# Patient Record
Sex: FEMALE | Race: WHITE | Employment: FULL TIME | URBAN - METROPOLITAN AREA
[De-identification: names, ages, dates, MRNs, and addresses within clinical notes are randomized per-mention and may not be internally consistent; named-entity substitution may affect disease eponyms.]

---

## 2018-02-27 PROCEDURE — 86901 BLOOD TYPING SEROLOGIC RH(D): CPT | Performed by: OBSTETRICS & GYNECOLOGY

## 2018-02-27 PROCEDURE — 86762 RUBELLA ANTIBODY: CPT | Performed by: OBSTETRICS & GYNECOLOGY

## 2018-02-27 PROCEDURE — 87389 HIV-1 AG W/HIV-1&-2 AB AG IA: CPT | Performed by: OBSTETRICS & GYNECOLOGY

## 2018-02-27 PROCEDURE — 86780 TREPONEMA PALLIDUM: CPT | Performed by: OBSTETRICS & GYNECOLOGY

## 2018-02-27 PROCEDURE — 86900 BLOOD TYPING SEROLOGIC ABO: CPT | Performed by: OBSTETRICS & GYNECOLOGY

## 2018-02-27 PROCEDURE — 86850 RBC ANTIBODY SCREEN: CPT | Performed by: OBSTETRICS & GYNECOLOGY

## 2018-02-27 PROCEDURE — 36415 COLL VENOUS BLD VENIPUNCTURE: CPT | Performed by: OBSTETRICS & GYNECOLOGY

## 2018-02-27 PROCEDURE — 87340 HEPATITIS B SURFACE AG IA: CPT | Performed by: OBSTETRICS & GYNECOLOGY

## 2018-02-27 PROCEDURE — 85025 COMPLETE CBC W/AUTO DIFF WBC: CPT | Performed by: OBSTETRICS & GYNECOLOGY

## 2018-03-07 PROCEDURE — 87491 CHLMYD TRACH DNA AMP PROBE: CPT | Performed by: OBSTETRICS & GYNECOLOGY

## 2018-03-07 PROCEDURE — 87624 HPV HI-RISK TYP POOLED RSLT: CPT | Performed by: OBSTETRICS & GYNECOLOGY

## 2018-03-07 PROCEDURE — 87086 URINE CULTURE/COLONY COUNT: CPT | Performed by: OBSTETRICS & GYNECOLOGY

## 2018-03-07 PROCEDURE — 88175 CYTOPATH C/V AUTO FLUID REDO: CPT | Performed by: OBSTETRICS & GYNECOLOGY

## 2018-03-07 PROCEDURE — 87591 N.GONORRHOEAE DNA AMP PROB: CPT | Performed by: OBSTETRICS & GYNECOLOGY

## 2018-04-12 PROCEDURE — 84165 PROTEIN E-PHORESIS SERUM: CPT | Performed by: FAMILY MEDICINE

## 2018-04-12 PROCEDURE — 82607 VITAMIN B-12: CPT | Performed by: FAMILY MEDICINE

## 2018-04-12 PROCEDURE — 86334 IMMUNOFIX E-PHORESIS SERUM: CPT | Performed by: FAMILY MEDICINE

## 2018-04-12 PROCEDURE — 82746 ASSAY OF FOLIC ACID SERUM: CPT | Performed by: FAMILY MEDICINE

## 2018-04-12 PROCEDURE — 83883 ASSAY NEPHELOMETRY NOT SPEC: CPT | Performed by: FAMILY MEDICINE

## 2018-04-12 PROCEDURE — 82747 ASSAY OF FOLIC ACID RBC: CPT | Performed by: FAMILY MEDICINE

## 2018-04-12 PROCEDURE — 86780 TREPONEMA PALLIDUM: CPT | Performed by: FAMILY MEDICINE

## 2018-04-15 PROBLEM — E55.9 VITAMIN D DEFICIENCY: Status: ACTIVE | Noted: 2018-04-15

## 2018-06-22 PROCEDURE — 82950 GLUCOSE TEST: CPT | Performed by: OBSTETRICS & GYNECOLOGY

## 2018-07-18 ENCOUNTER — OFFICE VISIT (OUTPATIENT)
Dept: FAMILY MEDICINE CLINIC | Facility: CLINIC | Age: 28
End: 2018-07-18

## 2018-07-18 VITALS
TEMPERATURE: 98 F | HEIGHT: 66 IN | HEART RATE: 88 BPM | RESPIRATION RATE: 18 BRPM | SYSTOLIC BLOOD PRESSURE: 102 MMHG | DIASTOLIC BLOOD PRESSURE: 60 MMHG | OXYGEN SATURATION: 98 % | WEIGHT: 136 LBS | BODY MASS INDEX: 21.86 KG/M2

## 2018-07-18 DIAGNOSIS — Z02.1 PRE-EMPLOYMENT EXAMINATION: Primary | ICD-10-CM

## 2018-07-18 PROCEDURE — 99395 PREV VISIT EST AGE 18-39: CPT | Performed by: FAMILY MEDICINE

## 2018-07-18 NOTE — PROGRESS NOTES
CHIEF COMPLAINT:   Patient presents with:  Employment Physical: work physical       HPI:   Christos Samson is a 32year old female who presents for a complete physical exam for work at [employer].     Patient is 7 months pregnant, but has no curre 12/20/2017 (Approximate)   SpO2 98%   BMI 21.95 kg/m²   Body mass index is 21.95 kg/m².      GENERAL: well developed, well nourished pregnant female in no apparent distress  SKIN: no rashes,no suspicious lesions  HEENT: atraumatic, normocephalic,ears and th

## 2018-08-07 PROCEDURE — 87389 HIV-1 AG W/HIV-1&-2 AB AG IA: CPT | Performed by: OBSTETRICS & GYNECOLOGY

## 2018-08-07 PROCEDURE — 36415 COLL VENOUS BLD VENIPUNCTURE: CPT | Performed by: OBSTETRICS & GYNECOLOGY

## 2018-09-01 ENCOUNTER — HOSPITAL ENCOUNTER (INPATIENT)
Facility: HOSPITAL | Age: 28
LOS: 3 days | Discharge: HOME | End: 2018-09-04
Attending: OBSTETRICS & GYNECOLOGY | Admitting: OBSTETRICS & GYNECOLOGY
Payer: COMMERCIAL

## 2018-09-01 ENCOUNTER — APPOINTMENT (OUTPATIENT)
Dept: ULTRASOUND IMAGING | Facility: HOSPITAL | Age: 28
End: 2018-09-01
Attending: OBSTETRICS & GYNECOLOGY
Payer: COMMERCIAL

## 2018-09-01 PROBLEM — Z34.90 NORMAL PREGNANCY: Status: ACTIVE | Noted: 2018-09-01

## 2018-09-01 PROBLEM — Z34.90 NORMAL PREGNANCY (HCC): Status: ACTIVE | Noted: 2018-09-01

## 2018-09-01 LAB
ALBUMIN SERPL-MCNC: 2.7 G/DL (ref 3.5–4.8)
ALBUMIN/GLOB SERPL: 0.6 {RATIO} (ref 1–2)
ALP LIVER SERPL-CCNC: 127 U/L (ref 37–98)
ALT SERPL-CCNC: 16 U/L (ref 14–54)
ANION GAP SERPL CALC-SCNC: 11 MMOL/L (ref 0–18)
ANTIBODY SCREEN: NEGATIVE
AST SERPL-CCNC: 16 U/L (ref 15–41)
BASOPHILS # BLD AUTO: 0.03 X10(3) UL (ref 0–0.1)
BASOPHILS NFR BLD AUTO: 0.2 %
BILIRUB SERPL-MCNC: 0.2 MG/DL (ref 0.1–2)
BILIRUBIN URINE: NEGATIVE
BLOOD URINE: NEGATIVE
BUN BLD-MCNC: 6 MG/DL (ref 8–20)
BUN/CREAT SERPL: 11.8 (ref 10–20)
CALCIUM BLD-MCNC: 9.2 MG/DL (ref 8.3–10.3)
CHLORIDE SERPL-SCNC: 108 MMOL/L (ref 101–111)
CO2 SERPL-SCNC: 21 MMOL/L (ref 22–32)
CONTROL RUN WITHIN 24 HOURS?: YES
CREAT BLD-MCNC: 0.51 MG/DL (ref 0.55–1.02)
EOSINOPHIL # BLD AUTO: 0.09 X10(3) UL (ref 0–0.3)
EOSINOPHIL NFR BLD AUTO: 0.7 %
ERYTHROCYTE [DISTWIDTH] IN BLOOD BY AUTOMATED COUNT: 13.1 % (ref 11.5–16)
GLOBULIN PLAS-MCNC: 4.2 G/DL (ref 2.5–4)
GLUCOSE BLD-MCNC: 79 MG/DL (ref 70–99)
GLUCOSE URINE: NEGATIVE
HCT VFR BLD AUTO: 35.4 % (ref 34–50)
HGB BLD-MCNC: 12.3 G/DL (ref 12–16)
IMMATURE GRANULOCYTE COUNT: 0.05 X10(3) UL (ref 0–1)
IMMATURE GRANULOCYTE RATIO %: 0.4 %
KETONE URINE: NEGATIVE
LEUKOCYTE ESTERASE URINE: NEGATIVE
LYMPHOCYTES # BLD AUTO: 1.77 X10(3) UL (ref 0.9–4)
LYMPHOCYTES NFR BLD AUTO: 14.7 %
M PROTEIN MFR SERPL ELPH: 6.9 G/DL (ref 6.1–8.3)
MCH RBC QN AUTO: 32.3 PG (ref 27–33.2)
MCHC RBC AUTO-ENTMCNC: 34.7 G/DL (ref 31–37)
MCV RBC AUTO: 92.9 FL (ref 81–100)
MONOCYTES # BLD AUTO: 0.88 X10(3) UL (ref 0.1–1)
MONOCYTES NFR BLD AUTO: 7.3 %
NEUTROPHIL ABS PRELIM: 9.25 X10 (3) UL (ref 1.3–6.7)
NEUTROPHILS # BLD AUTO: 9.25 X10(3) UL (ref 1.3–6.7)
NEUTROPHILS NFR BLD AUTO: 76.7 %
NITRITE URINE: NEGATIVE
OSMOLALITY SERPL CALC.SUM OF ELEC: 287 MOSM/KG (ref 275–295)
PH URINE: 7.5 (ref 5–8)
PLATELET # BLD AUTO: 244 10(3)UL (ref 150–450)
POTASSIUM SERPL-SCNC: 4 MMOL/L (ref 3.6–5.1)
PROTEIN URINE: NEGATIVE
RBC # BLD AUTO: 3.81 X10(6)UL (ref 3.8–5.1)
RED CELL DISTRIBUTION WIDTH-SD: 43.8 FL (ref 35.1–46.3)
RH BLOOD TYPE: POSITIVE
SODIUM SERPL-SCNC: 140 MMOL/L (ref 136–144)
SPEC GRAVITY: 1.01 (ref 1–1.03)
T PALLIDUM AB SER QL IA: NONREACTIVE
URATE SERPL-MCNC: 4.1 MG/DL (ref 2.4–8)
URINE CLARITY: CLEAR
URINE COLOR: YELLOW
UROBILINOGEN URINE: 0.2
WBC # BLD AUTO: 12.1 X10(3) UL (ref 4–13)

## 2018-09-01 PROCEDURE — 85025 COMPLETE CBC W/AUTO DIFF WBC: CPT | Performed by: OBSTETRICS & GYNECOLOGY

## 2018-09-01 PROCEDURE — 86780 TREPONEMA PALLIDUM: CPT | Performed by: OBSTETRICS & GYNECOLOGY

## 2018-09-01 PROCEDURE — 76805 OB US >/= 14 WKS SNGL FETUS: CPT | Performed by: OBSTETRICS & GYNECOLOGY

## 2018-09-01 PROCEDURE — 86850 RBC ANTIBODY SCREEN: CPT | Performed by: OBSTETRICS & GYNECOLOGY

## 2018-09-01 PROCEDURE — 81002 URINALYSIS NONAUTO W/O SCOPE: CPT

## 2018-09-01 PROCEDURE — 80053 COMPREHEN METABOLIC PANEL: CPT | Performed by: OBSTETRICS & GYNECOLOGY

## 2018-09-01 PROCEDURE — 84112 EVAL AMNIOTIC FLUID PROTEIN: CPT

## 2018-09-01 PROCEDURE — 99214 OFFICE O/P EST MOD 30 MIN: CPT

## 2018-09-01 PROCEDURE — 84550 ASSAY OF BLOOD/URIC ACID: CPT | Performed by: OBSTETRICS & GYNECOLOGY

## 2018-09-01 PROCEDURE — 86901 BLOOD TYPING SEROLOGIC RH(D): CPT | Performed by: OBSTETRICS & GYNECOLOGY

## 2018-09-01 PROCEDURE — 86900 BLOOD TYPING SEROLOGIC ABO: CPT | Performed by: OBSTETRICS & GYNECOLOGY

## 2018-09-01 RX ORDER — MULTIVIT-MIN/IRON/FOLIC ACID/K 18-600-40
5000 CAPSULE ORAL DAILY
COMMUNITY
End: 2019-06-17 | Stop reason: ALTCHOICE

## 2018-09-01 RX ORDER — EPHEDRINE SULFATE/0.9% NACL/PF 25 MG/5 ML
5 SYRINGE (ML) INTRAVENOUS AS NEEDED
Status: DISCONTINUED | OUTPATIENT
Start: 2018-09-01 | End: 2018-09-02

## 2018-09-01 RX ORDER — SODIUM CHLORIDE, SODIUM LACTATE, POTASSIUM CHLORIDE, CALCIUM CHLORIDE 600; 310; 30; 20 MG/100ML; MG/100ML; MG/100ML; MG/100ML
INJECTION, SOLUTION INTRAVENOUS CONTINUOUS
Status: DISCONTINUED | OUTPATIENT
Start: 2018-09-01 | End: 2018-09-02

## 2018-09-01 RX ORDER — NALBUPHINE HCL 10 MG/ML
2.5 AMPUL (ML) INJECTION
Status: DISCONTINUED | OUTPATIENT
Start: 2018-09-01 | End: 2018-09-02

## 2018-09-01 RX ORDER — BETAMETHASONE SODIUM PHOSPHATE AND BETAMETHASONE ACETATE 3; 3 MG/ML; MG/ML
12 INJECTION, SUSPENSION INTRA-ARTICULAR; INTRALESIONAL; INTRAMUSCULAR; SOFT TISSUE EVERY 24 HOURS
Status: DISCONTINUED | OUTPATIENT
Start: 2018-09-01 | End: 2018-09-02

## 2018-09-01 NOTE — PROGRESS NOTES
Pt is a 32year old female admitted to TRG3/TRG3-A. Patient presents with:  R/o Rom: 11 am pt noticied fluid on pants, started to feel nauseus at around 1pm and abdominal pain in the lower right quadrent at noon.       Pt is  35w3d intra-uterine pregn

## 2018-09-01 NOTE — H&P
Sabina Patient Status:  Observation    1990 MRN [id number]   Location [address] [place] [address] Attending Farhad Bobo MD   Hosp Day # 0 PCP Calvin Martinez DO     SUBJECTIVE:    Rivas Lamp Ca moderate  Fetal Heart Rate accelerations: yes  Uterine contractions: regular, every 2-3 minutes      Cervix: dilation 3-4, effacement 80 and station 0.   SSE- no ff, no pool, no nitrizine     Lab Review:  B, Rh+, Rubella-immune, Hepatitis B surface antigen

## 2018-09-02 PROBLEM — Z34.90 PREGNANCY: Status: ACTIVE | Noted: 2018-09-02

## 2018-09-02 PROBLEM — Z34.90 PREGNANCY (HCC): Status: ACTIVE | Noted: 2018-09-02

## 2018-09-02 PROCEDURE — 4A1H74Z MONITORING OF PRODUCTS OF CONCEPTION, CARDIAC ELECTRICAL ACTIVITY, VIA NATURAL OR ARTIFICIAL OPENING: ICD-10-PCS | Performed by: OBSTETRICS & GYNECOLOGY

## 2018-09-02 PROCEDURE — 10907ZC DRAINAGE OF AMNIOTIC FLUID, THERAPEUTIC FROM PRODUCTS OF CONCEPTION, VIA NATURAL OR ARTIFICIAL OPENING: ICD-10-PCS | Performed by: OBSTETRICS & GYNECOLOGY

## 2018-09-02 PROCEDURE — 0KQM0ZZ REPAIR PERINEUM MUSCLE, OPEN APPROACH: ICD-10-PCS | Performed by: OBSTETRICS & GYNECOLOGY

## 2018-09-02 PROCEDURE — 88307 TISSUE EXAM BY PATHOLOGIST: CPT | Performed by: OBSTETRICS & GYNECOLOGY

## 2018-09-02 RX ORDER — IBUPROFEN 600 MG/1
600 TABLET ORAL ONCE AS NEEDED
Status: DISCONTINUED | OUTPATIENT
Start: 2018-09-02 | End: 2018-09-02

## 2018-09-02 RX ORDER — DOCUSATE SODIUM 100 MG/1
100 CAPSULE, LIQUID FILLED ORAL
Status: DISCONTINUED | OUTPATIENT
Start: 2018-09-02 | End: 2018-09-04

## 2018-09-02 RX ORDER — ACETAMINOPHEN 325 MG/1
650 TABLET ORAL EVERY 6 HOURS PRN
Status: DISCONTINUED | OUTPATIENT
Start: 2018-09-02 | End: 2018-09-04

## 2018-09-02 RX ORDER — ZOLPIDEM TARTRATE 5 MG/1
5 TABLET ORAL NIGHTLY PRN
Status: DISCONTINUED | OUTPATIENT
Start: 2018-09-02 | End: 2018-09-04

## 2018-09-02 RX ORDER — DEXTROSE, SODIUM CHLORIDE, SODIUM LACTATE, POTASSIUM CHLORIDE, AND CALCIUM CHLORIDE 5; .6; .31; .03; .02 G/100ML; G/100ML; G/100ML; G/100ML; G/100ML
INJECTION, SOLUTION INTRAVENOUS AS NEEDED
Status: DISCONTINUED | OUTPATIENT
Start: 2018-09-02 | End: 2018-09-02

## 2018-09-02 RX ORDER — SIMETHICONE 80 MG
80 TABLET,CHEWABLE ORAL 3 TIMES DAILY PRN
Status: DISCONTINUED | OUTPATIENT
Start: 2018-09-02 | End: 2018-09-04

## 2018-09-02 RX ORDER — TERBUTALINE SULFATE 1 MG/ML
0.25 INJECTION, SOLUTION SUBCUTANEOUS AS NEEDED
Status: DISCONTINUED | OUTPATIENT
Start: 2018-09-02 | End: 2018-09-02

## 2018-09-02 RX ORDER — TRISODIUM CITRATE DIHYDRATE AND CITRIC ACID MONOHYDRATE 500; 334 MG/5ML; MG/5ML
30 SOLUTION ORAL AS NEEDED
Status: DISCONTINUED | OUTPATIENT
Start: 2018-09-02 | End: 2018-09-02

## 2018-09-02 RX ORDER — BISACODYL 10 MG
10 SUPPOSITORY, RECTAL RECTAL ONCE AS NEEDED
Status: DISCONTINUED | OUTPATIENT
Start: 2018-09-02 | End: 2018-09-04

## 2018-09-02 RX ORDER — IBUPROFEN 600 MG/1
600 TABLET ORAL EVERY 6 HOURS
Status: DISCONTINUED | OUTPATIENT
Start: 2018-09-02 | End: 2018-09-04

## 2018-09-02 NOTE — PROGRESS NOTES
[facility]   Progress Note     Solo Maya Patient Status:  Inpatient    1990 MRN [id number]   Location [address] [facility] Attending Shereen Fairbanks MD   Hosp Day # 0 PCP Calvin Nguyen DO       SUBJECTIVE:    Interval Histo

## 2018-09-02 NOTE — PROGRESS NOTES
Patient up to bathroom with assist x 1. Voided 100 ml at this time. Patient transferred to mother/baby room 2296 per wheelchair in stable condition with baby and personal belongings. Visited baby in NICU prior.   Accompanied by significant other and staff

## 2018-09-02 NOTE — PROGRESS NOTES
Report from Manuel Andres RN @ this time. POC discussed and will enforce. Patient stable in bed, concerned about going into labor. All questions answered and patient appears calmer after questions answered and verbalized understanding of POC.  Call light within

## 2018-09-02 NOTE — PLAN OF CARE
Problem: BIRTH - VAGINAL/ SECTION  Goal: Fetal and maternal status remain reassuring during the birth process  INTERVENTIONS:  - Monitor vital signs  - Monitor fetal heart rate  - Monitor uterine activity  - Monitor labor progression (vaginal deliv physical deficits and behaviors that affect risk of falls.   - [place] fall precautions as indicated by assessment.  - Educate pt/family on patient safety including physical limitations  - Instruct pt to call for assistance with activity based on assessme

## 2018-09-02 NOTE — PROGRESS NOTES
Report to AAKASH Salvador RN @ this time. POC discussed and enforced. Pt stable in bed with IV infusing and nguyen draining. Call light within reach and patient verbalized understanding of POC.

## 2018-09-02 NOTE — L&D DELIVERY NOTE
Vaginal Delivery Note          Espinoza Simmons Patient Status:  Inpatient    1990 MRN [id number]   Location [facility] Attending Marceline Denver, MD   Hosp Day # 1 PCP Al shortly thereafter. The cord was noted to be very swollen. Examination of the cervix, vagina, and perineum demonstrated a second degree laceration. The vaginal laceration was repaired using 2-0 vicryl rapide in a running locked fashion.   A deep crown

## 2018-09-03 LAB
BASOPHILS # BLD AUTO: 0.02 X10(3) UL (ref 0–0.1)
BASOPHILS NFR BLD AUTO: 0.1 %
EOSINOPHIL # BLD AUTO: 0.01 X10(3) UL (ref 0–0.3)
EOSINOPHIL NFR BLD AUTO: 0.1 %
ERYTHROCYTE [DISTWIDTH] IN BLOOD BY AUTOMATED COUNT: 13.2 % (ref 11.5–16)
HCT VFR BLD AUTO: 30 % (ref 34–50)
HGB BLD-MCNC: 10.1 G/DL (ref 12–16)
IMMATURE GRANULOCYTE COUNT: 0.15 X10(3) UL (ref 0–1)
IMMATURE GRANULOCYTE RATIO %: 0.8 %
LYMPHOCYTES # BLD AUTO: 1.91 X10(3) UL (ref 0.9–4)
LYMPHOCYTES NFR BLD AUTO: 10.4 %
MCH RBC QN AUTO: 32 PG (ref 27–33.2)
MCHC RBC AUTO-ENTMCNC: 33.7 G/DL (ref 31–37)
MCV RBC AUTO: 94.9 FL (ref 81–100)
MONOCYTES # BLD AUTO: 1.24 X10(3) UL (ref 0.1–1)
MONOCYTES NFR BLD AUTO: 6.8 %
NEUTROPHIL ABS PRELIM: 14.96 X10 (3) UL (ref 1.3–6.7)
NEUTROPHILS # BLD AUTO: 14.96 X10(3) UL (ref 1.3–6.7)
NEUTROPHILS NFR BLD AUTO: 81.8 %
PLATELET # BLD AUTO: 232 10(3)UL (ref 150–450)
RBC # BLD AUTO: 3.16 X10(6)UL (ref 3.8–5.1)
RED CELL DISTRIBUTION WIDTH-SD: 45.2 FL (ref 35.1–46.3)
WBC # BLD AUTO: 18.3 X10(3) UL (ref 4–13)

## 2018-09-03 PROCEDURE — 85025 COMPLETE CBC W/AUTO DIFF WBC: CPT | Performed by: OBSTETRICS & GYNECOLOGY

## 2018-09-04 VITALS
HEIGHT: 65.98 IN | SYSTOLIC BLOOD PRESSURE: 117 MMHG | RESPIRATION RATE: 16 BRPM | WEIGHT: 143 LBS | HEART RATE: 70 BPM | OXYGEN SATURATION: 96 % | BODY MASS INDEX: 22.98 KG/M2 | TEMPERATURE: 99 F | DIASTOLIC BLOOD PRESSURE: 79 MMHG

## 2018-09-04 RX ORDER — HYDROCODONE BITARTRATE AND ACETAMINOPHEN 5; 325 MG/1; MG/1
1 TABLET ORAL EVERY 6 HOURS PRN
Status: DISCONTINUED | OUTPATIENT
Start: 2018-09-04 | End: 2018-09-04

## 2018-09-04 RX ORDER — HYDROCODONE BITARTRATE AND ACETAMINOPHEN 5; 325 MG/1; MG/1
1 TABLET ORAL EVERY 6 HOURS PRN
Qty: 20 TABLET | Refills: 0 | Status: SHIPPED | OUTPATIENT
Start: 2018-09-04 | End: 2018-10-16

## 2018-09-04 NOTE — PROGRESS NOTES
PPD#2  Pt without complaints except perineal pain when ambulating - more comfortable with lying down  /79 (BP Location: Left arm)   Pulse 70   Temp 99.1 °F (37.3 °C) (Oral)   Resp 16   Ht 5' 5.98\" (1.676 m)   Wt 143 lb (64.9 kg)   LMP 12/20/2017 (Ap

## 2018-09-04 NOTE — PROGRESS NOTES
Discharge to home with all belongings, baby remains in NICU. Accompanied by staff to car, no questions regarding discharge instructions.

## 2018-09-04 NOTE — CM/SW NOTE
09/04/18 1200   CM/SW Referral Data   Referral Source Nurse;Family; Social Work (self-referral)   Reason for Referral Discharge planning;Psychoscial assessment   Informant Patient     SW completed an assessment with pt and , Yumi Ramey, to provide suppo

## 2018-09-07 ENCOUNTER — TELEPHONE (OUTPATIENT)
Dept: OBGYN UNIT | Facility: HOSPITAL | Age: 28
End: 2018-09-07

## 2018-09-07 NOTE — PROGRESS NOTES
Reviewed self care w / mom, she verbalizes understanding of instructions reviewed. Encourage to follow up w/ MDs as directed and w/ questions/concerns. Baby doing well in nicu and mom visits daily.

## 2018-09-27 ENCOUNTER — NURSE ONLY (OUTPATIENT)
Dept: LACTATION | Facility: HOSPITAL | Age: 28
End: 2018-09-27
Attending: OBSTETRICS & GYNECOLOGY
Payer: COMMERCIAL

## 2018-09-27 VITALS — TEMPERATURE: 98 F

## 2018-09-27 PROCEDURE — 99212 OFFICE O/P EST SF 10 MIN: CPT

## 2019-01-10 ENCOUNTER — HOSPITAL ENCOUNTER (OUTPATIENT)
Facility: HOSPITAL | Age: 29
Setting detail: OBSERVATION
Discharge: HOME | End: 2019-01-12
Attending: EMERGENCY MEDICINE | Admitting: HOSPITALIST
Payer: COMMERCIAL

## 2019-01-10 DIAGNOSIS — R10.9 ABDOMINAL PAIN: ICD-10-CM

## 2019-01-10 DIAGNOSIS — K81.0 ACUTE CHOLECYSTITIS: Primary | ICD-10-CM

## 2019-01-10 LAB
ALBUMIN SERPL-MCNC: 4.4 G/DL (ref 3.1–4.5)
ALBUMIN/GLOB SERPL: 1.3 {RATIO} (ref 1–2)
ALP LIVER SERPL-CCNC: 66 U/L (ref 37–98)
ALT SERPL-CCNC: 18 U/L (ref 14–54)
ANION GAP SERPL CALC-SCNC: 8 MMOL/L (ref 0–18)
AST SERPL-CCNC: 18 U/L (ref 15–41)
BASOPHILS # BLD AUTO: 0.04 X10(3) UL (ref 0–0.1)
BASOPHILS NFR BLD AUTO: 0.6 %
BILIRUB SERPL-MCNC: 0.3 MG/DL (ref 0.1–2)
BILIRUB UR QL STRIP.AUTO: NEGATIVE
BUN BLD-MCNC: 13 MG/DL (ref 8–20)
BUN/CREAT SERPL: 19.7 (ref 10–20)
CALCIUM BLD-MCNC: 9.2 MG/DL (ref 8.3–10.3)
CHLORIDE SERPL-SCNC: 108 MMOL/L (ref 101–111)
CLARITY UR REFRACT.AUTO: CLEAR
CO2 SERPL-SCNC: 25 MMOL/L (ref 22–32)
COLOR UR AUTO: YELLOW
CREAT BLD-MCNC: 0.66 MG/DL (ref 0.55–1.02)
EOSINOPHIL # BLD AUTO: 0.13 X10(3) UL (ref 0–0.3)
EOSINOPHIL NFR BLD AUTO: 1.9 %
ERYTHROCYTE [DISTWIDTH] IN BLOOD BY AUTOMATED COUNT: 13.3 % (ref 11.5–16)
GLOBULIN PLAS-MCNC: 3.4 G/DL (ref 2.8–4.4)
GLUCOSE BLD-MCNC: 86 MG/DL (ref 70–99)
GLUCOSE UR STRIP.AUTO-MCNC: NEGATIVE MG/DL
HCT VFR BLD AUTO: 38.6 % (ref 34–50)
HGB BLD-MCNC: 13 G/DL (ref 12–16)
IMMATURE GRANULOCYTE COUNT: 0.02 X10(3) UL (ref 0–1)
IMMATURE GRANULOCYTE RATIO %: 0.3 %
KETONES UR STRIP.AUTO-MCNC: 80 MG/DL
LEUKOCYTE ESTERASE UR QL STRIP.AUTO: NEGATIVE
LIPASE: 255 U/L (ref 73–393)
LYMPHOCYTES # BLD AUTO: 1.68 X10(3) UL (ref 0.9–4)
LYMPHOCYTES NFR BLD AUTO: 24.8 %
M PROTEIN MFR SERPL ELPH: 7.8 G/DL (ref 6.4–8.2)
MCH RBC QN AUTO: 31.2 PG (ref 27–33.2)
MCHC RBC AUTO-ENTMCNC: 33.7 G/DL (ref 31–37)
MCV RBC AUTO: 92.6 FL (ref 81–100)
MONOCYTES # BLD AUTO: 0.52 X10(3) UL (ref 0.1–1)
MONOCYTES NFR BLD AUTO: 7.7 %
NEUTROPHIL ABS PRELIM: 4.39 X10 (3) UL (ref 1.3–6.7)
NEUTROPHILS # BLD AUTO: 4.39 X10(3) UL (ref 1.3–6.7)
NEUTROPHILS NFR BLD AUTO: 64.7 %
NITRITE UR QL STRIP.AUTO: NEGATIVE
OSMOLALITY SERPL CALC.SUM OF ELEC: 291 MOSM/KG (ref 275–295)
PH UR STRIP.AUTO: 5 [PH] (ref 4.5–8)
PLATELET # BLD AUTO: 318 10(3)UL (ref 150–450)
POCT LOT NUMBER: NORMAL
POCT URINE PREGNANCY: NEGATIVE
POTASSIUM SERPL-SCNC: 4.4 MMOL/L (ref 3.6–5.1)
PROT UR STRIP.AUTO-MCNC: NEGATIVE MG/DL
RBC # BLD AUTO: 4.17 X10(6)UL (ref 3.8–5.1)
RED CELL DISTRIBUTION WIDTH-SD: 45.5 FL (ref 35.1–46.3)
SODIUM SERPL-SCNC: 141 MMOL/L (ref 136–144)
SP GR UR STRIP.AUTO: 1.01 (ref 1–1.03)
UROBILINOGEN UR STRIP.AUTO-MCNC: <2 MG/DL
WBC # BLD AUTO: 6.8 X10(3) UL (ref 4–13)

## 2019-01-10 PROCEDURE — 85025 COMPLETE CBC W/AUTO DIFF WBC: CPT | Performed by: EMERGENCY MEDICINE

## 2019-01-10 PROCEDURE — 99285 EMERGENCY DEPT VISIT HI MDM: CPT | Performed by: NURSE PRACTITIONER

## 2019-01-10 PROCEDURE — 85025 COMPLETE CBC W/AUTO DIFF WBC: CPT

## 2019-01-10 PROCEDURE — 83690 ASSAY OF LIPASE: CPT | Performed by: EMERGENCY MEDICINE

## 2019-01-10 PROCEDURE — 96375 TX/PRO/DX INJ NEW DRUG ADDON: CPT | Performed by: NURSE PRACTITIONER

## 2019-01-10 PROCEDURE — 81025 URINE PREGNANCY TEST: CPT | Performed by: NURSE PRACTITIONER

## 2019-01-10 PROCEDURE — 99285 EMERGENCY DEPT VISIT HI MDM: CPT | Performed by: EMERGENCY MEDICINE

## 2019-01-10 PROCEDURE — 96374 THER/PROPH/DIAG INJ IV PUSH: CPT | Performed by: EMERGENCY MEDICINE

## 2019-01-10 PROCEDURE — 81001 URINALYSIS AUTO W/SCOPE: CPT | Performed by: EMERGENCY MEDICINE

## 2019-01-10 PROCEDURE — 80053 COMPREHEN METABOLIC PANEL: CPT | Performed by: EMERGENCY MEDICINE

## 2019-01-10 PROCEDURE — 80053 COMPREHEN METABOLIC PANEL: CPT

## 2019-01-10 PROCEDURE — 96375 TX/PRO/DX INJ NEW DRUG ADDON: CPT | Performed by: EMERGENCY MEDICINE

## 2019-01-10 PROCEDURE — 81025 URINE PREGNANCY TEST: CPT | Performed by: EMERGENCY MEDICINE

## 2019-01-10 PROCEDURE — 83690 ASSAY OF LIPASE: CPT

## 2019-01-10 PROCEDURE — 96374 THER/PROPH/DIAG INJ IV PUSH: CPT | Performed by: NURSE PRACTITIONER

## 2019-01-10 RX ORDER — SODIUM CHLORIDE 9 MG/ML
INJECTION, SOLUTION INTRAVENOUS CONTINUOUS
Status: DISCONTINUED | OUTPATIENT
Start: 2019-01-10 | End: 2019-01-12

## 2019-01-10 RX ORDER — METOCLOPRAMIDE HYDROCHLORIDE 5 MG/ML
10 INJECTION INTRAMUSCULAR; INTRAVENOUS EVERY 8 HOURS PRN
Status: DISCONTINUED | OUTPATIENT
Start: 2019-01-10 | End: 2019-01-12

## 2019-01-10 RX ORDER — ONDANSETRON 2 MG/ML
4 INJECTION INTRAMUSCULAR; INTRAVENOUS ONCE
Status: COMPLETED | OUTPATIENT
Start: 2019-01-10 | End: 2019-01-10

## 2019-01-10 RX ORDER — MORPHINE SULFATE 4 MG/ML
1 INJECTION, SOLUTION INTRAMUSCULAR; INTRAVENOUS EVERY 2 HOUR PRN
Status: DISCONTINUED | OUTPATIENT
Start: 2019-01-10 | End: 2019-01-10

## 2019-01-10 RX ORDER — MORPHINE SULFATE 4 MG/ML
2 INJECTION, SOLUTION INTRAMUSCULAR; INTRAVENOUS EVERY 2 HOUR PRN
Status: DISCONTINUED | OUTPATIENT
Start: 2019-01-10 | End: 2019-01-10

## 2019-01-10 RX ORDER — HYDROMORPHONE HYDROCHLORIDE 1 MG/ML
0.5 INJECTION, SOLUTION INTRAMUSCULAR; INTRAVENOUS; SUBCUTANEOUS
Status: DISCONTINUED | OUTPATIENT
Start: 2019-01-10 | End: 2019-01-12

## 2019-01-10 RX ORDER — MORPHINE SULFATE 4 MG/ML
INJECTION, SOLUTION INTRAMUSCULAR; INTRAVENOUS
Status: DISPENSED
Start: 2019-01-10 | End: 2019-01-11

## 2019-01-10 RX ORDER — ONDANSETRON 2 MG/ML
4 INJECTION INTRAMUSCULAR; INTRAVENOUS EVERY 4 HOURS PRN
Status: CANCELLED | OUTPATIENT
Start: 2019-01-10

## 2019-01-10 RX ORDER — HYDROMORPHONE HYDROCHLORIDE 1 MG/ML
0.5 INJECTION, SOLUTION INTRAMUSCULAR; INTRAVENOUS; SUBCUTANEOUS ONCE
Status: COMPLETED | OUTPATIENT
Start: 2019-01-10 | End: 2019-01-10

## 2019-01-10 RX ORDER — DEXTROSE AND SODIUM CHLORIDE 5; .45 G/100ML; G/100ML
INJECTION, SOLUTION INTRAVENOUS CONTINUOUS
Status: CANCELLED | OUTPATIENT
Start: 2019-01-10 | End: 2019-01-10

## 2019-01-10 RX ORDER — SODIUM CHLORIDE 9 MG/ML
INJECTION, SOLUTION INTRAVENOUS CONTINUOUS
Status: DISCONTINUED | OUTPATIENT
Start: 2019-01-10 | End: 2019-01-10

## 2019-01-10 RX ORDER — ONDANSETRON 2 MG/ML
4 INJECTION INTRAMUSCULAR; INTRAVENOUS EVERY 6 HOURS PRN
Status: DISCONTINUED | OUTPATIENT
Start: 2019-01-10 | End: 2019-01-12

## 2019-01-10 RX ORDER — MORPHINE SULFATE 4 MG/ML
4 INJECTION, SOLUTION INTRAMUSCULAR; INTRAVENOUS EVERY 2 HOUR PRN
Status: DISCONTINUED | OUTPATIENT
Start: 2019-01-10 | End: 2019-01-10

## 2019-01-10 RX ORDER — HYDROMORPHONE HYDROCHLORIDE 1 MG/ML
0.5 INJECTION, SOLUTION INTRAMUSCULAR; INTRAVENOUS; SUBCUTANEOUS EVERY 30 MIN PRN
Status: CANCELLED | OUTPATIENT
Start: 2019-01-10 | End: 2019-01-10

## 2019-01-10 NOTE — ED INITIAL ASSESSMENT (HPI)
Pt reports she had and US of gallbladder yesterday. Called by her PCP to come to ER for admission for cholecystectomy. MD is Dr Loree Cabezas.

## 2019-01-10 NOTE — ED INITIAL ASSESSMENT (HPI)
VSS, appologized for wait.   Patient in NAD at this time, reminded patient that she needs to remain NPO

## 2019-01-10 NOTE — ED PROVIDER NOTES
Patient Seen in: [facility] Emergency Department    History   Patient presents with:  Abdomen/Flank Pain (GI/)    Stated Complaint: gallstones, instructed to come to ED due to needing surgery    HPI    22-year-old 4-month postpartum female present 167.6 cm (5' 6\")   Wt 52.2 kg   SpO2 96%   BMI 18.56 kg/m²         Physical Exam   Constitutional: She is oriented to person, place, and time. She appears well-developed and well-nourished. HENT:   Head: Normocephalic and atraumatic.    Right Ear: Extern abdominal tenderness COMPARISON STUDY:  None. TECHNIQUE:  Realtime ultrasonography was performed of the right upper quadrant and static images obtained. FINDINGS:  Pancreas: Unremarkable. Liver:  The liver measures 17.3cm at the midclavicular line and demon

## 2019-01-11 ENCOUNTER — APPOINTMENT (OUTPATIENT)
Dept: NUCLEAR MEDICINE | Facility: HOSPITAL | Age: 29
End: 2019-01-11
Attending: SURGERY
Payer: COMMERCIAL

## 2019-01-11 ENCOUNTER — APPOINTMENT (OUTPATIENT)
Dept: CT IMAGING | Facility: HOSPITAL | Age: 29
End: 2019-01-11
Attending: SURGERY
Payer: COMMERCIAL

## 2019-01-11 PROCEDURE — 78227 HEPATOBIL SYST IMAGE W/DRUG: CPT | Performed by: SURGERY

## 2019-01-11 PROCEDURE — 74177 CT ABD & PELVIS W/CONTRAST: CPT | Performed by: SURGERY

## 2019-01-11 RX ORDER — PANTOPRAZOLE SODIUM 40 MG/1
40 TABLET, DELAYED RELEASE ORAL
Status: DISCONTINUED | OUTPATIENT
Start: 2019-01-12 | End: 2019-01-12

## 2019-01-11 RX ORDER — KETOROLAC TROMETHAMINE 30 MG/ML
30 INJECTION, SOLUTION INTRAMUSCULAR; INTRAVENOUS EVERY 6 HOURS PRN
Status: DISCONTINUED | OUTPATIENT
Start: 2019-01-11 | End: 2019-01-12

## 2019-01-11 NOTE — CONSULTS
[facility]  Report of Consultation    Elyse Brand Patient Status:  Observation    1990 MRN [id number]   [facility] 3NW-A Attending Archana Moss   Hosp Day # 0 PCP Florian Paz DO     Reason for Consultation:    Koffi Nugent Q3H PRN    Home Medications:      No current facility-administered medications on file prior to encounter. Current Outpatient Medications on File Prior to Encounter:  Sertraline HCl 50 MG Oral Tab Take 1 tablet (50 mg total) by mouth daily.  Disp: 60 tabl 17.3cm at the midclavicular line and demonstrates homogeneous echotexture. The main portal vein is patent and demonstrates a systolic flow velocity of 45cm/second, which is within normal limits.  Flow is hepatopetal.   Biliary System: No intra- or extrahep

## 2019-01-11 NOTE — PLAN OF CARE
PT NOT ABLE TO TOLERATE ORAL CONTRAST AFTER BEEN PREMEDICATED WITH ZOFRAN, PT HAS AN EMESIS EPISODE. CT RN NOTIFIED ANT STATES THEY WILL USE IV CONTRAST INSTEAD.

## 2019-01-11 NOTE — PROGRESS NOTES
[facility] Hospitalist Progress Note                                                                   [address]  9/16/1990    CC: FU abd pain    Interval History:  - Still with pain  - Seen by ambulate     Amanda Marrero MD  [facility] Hospitalist  Pager: [phone]

## 2019-01-11 NOTE — PLAN OF CARE
ABD SOFT AND TENDER. PT C/O OF INCREASE ABD PAIN WITH MOVEMENT. STATES MEDS ARE WORKING ARE HEPIING RELIVING PAIN. POC UPDATED, PT VERBALIZED UNDERSTANDING.

## 2019-01-11 NOTE — H&P
[facility] Hospitalist History and Physical      CC: Abd pain, N/V    PCP: Patel Stewart DO    History of Present Illness: Patient is a 29year old female with post partum anxiety presenting due to abd pain.  She has been having pain with accompanied N/V since Xm • Heart Disease Neg    • Stroke Neg        Review of Systems: *Negative except as otherwise noted in HPI    Objective:  /59 (BP Location: Left arm)   Pulse 64   Temp 98.5 °F (36.9 °C) (Oral)   Resp 15   Ht 5' 6\" (1.676 m)   Wt 115 lb (52.2 kg)   S

## 2019-01-12 VITALS
SYSTOLIC BLOOD PRESSURE: 120 MMHG | OXYGEN SATURATION: 100 % | TEMPERATURE: 98 F | RESPIRATION RATE: 14 BRPM | HEART RATE: 106 BPM | BODY MASS INDEX: 18.48 KG/M2 | HEIGHT: 66 IN | WEIGHT: 115 LBS | DIASTOLIC BLOOD PRESSURE: 80 MMHG

## 2019-01-12 PROCEDURE — 88305 TISSUE EXAM BY PATHOLOGIST: CPT | Performed by: INTERNAL MEDICINE

## 2019-01-12 PROCEDURE — 99152 MOD SED SAME PHYS/QHP 5/>YRS: CPT | Performed by: INTERNAL MEDICINE

## 2019-01-12 PROCEDURE — 0DB68ZX EXCISION OF STOMACH, VIA NATURAL OR ARTIFICIAL OPENING ENDOSCOPIC, DIAGNOSTIC: ICD-10-PCS | Performed by: INTERNAL MEDICINE

## 2019-01-12 PROCEDURE — C9113 INJ PANTOPRAZOLE SODIUM, VIA: HCPCS | Performed by: SURGERY

## 2019-01-12 RX ORDER — SUCRALFATE ORAL 1 G/10ML
1 SUSPENSION ORAL
Qty: 420 ML | Refills: 1 | Status: SHIPPED | OUTPATIENT
Start: 2019-01-12 | End: 2019-05-09

## 2019-01-12 RX ORDER — PANTOPRAZOLE SODIUM 40 MG/1
40 TABLET, DELAYED RELEASE ORAL DAILY
Qty: 30 TABLET | Refills: 1 | Status: SHIPPED | OUTPATIENT
Start: 2019-01-12 | End: 2019-05-09 | Stop reason: ALTCHOICE

## 2019-01-12 RX ORDER — MIDAZOLAM HYDROCHLORIDE 1 MG/ML
INJECTION INTRAMUSCULAR; INTRAVENOUS
Status: DISCONTINUED | OUTPATIENT
Start: 2019-01-12 | End: 2019-01-12 | Stop reason: HOSPADM

## 2019-01-12 NOTE — DISCHARGE SUMMARY
General Medicine Discharge Summary     Patient ID:  Suly Reynoso  29year old  9/16/1990    Admit date: 1/10/2019    Discharge date and time:  1/12/19    Attending Physician: No att. providers found (LRB):  ESOPHAGOGASTRODUODENOSCOPY (EGD) (N/A)       Patient instructions:      Discharge Medication List as of 1/12/2019 12:10 PM    START taking these medications    Pantoprazole Sodium 40 MG Oral Tab EC  Take 1 tablet (40 mg total) by mouth daily.  Befor

## 2019-01-12 NOTE — PROGRESS NOTES
Pt. returned from [facility] at 1055 s/p EGD. Per Dr. Velarde Route ok for pt. to eat and d/c when tolerating diet. Pt. tolerated diet. Pt. requesting Dilaudid. Spoke with pt. Re: pain management. Pt. refused toradol stating \"it doesn't do anything. \"   Pt. Faith Smiling

## 2019-01-12 NOTE — PLAN OF CARE
Uriah Knutson called writing RN and reports HIDA scan was negative, okay to give patient a clear liquid diet. Orders received to consult [facility].  Dr.Fallah nix, will await response.

## 2019-01-12 NOTE — CONSULTS
[address] Patient Status:  Observation    1990 MRN [id number]   [facility] 3NW-A Attending [facility]   Hosp Day # 0 PCP DO Alphonso Conway Rikki is a 29year old female for RUQ abdomina CARDIO: RRR  GI: good BS's,no masses, HSM or tenderness RECTAL: Exam not done. EXTREM. : no edema, no clubbing NEURO: A + O    ASSESSMENT AND PLAN:   Aguila Iyer is a 29year old female  with RUQ abdominal pain. Symptoms for 3 weeks.   Pain is sharp, const

## 2019-01-12 NOTE — OPERATIVE REPORT
[address] Patient Status:  Observation    1990 MRN [id number]   [facility] ENDOSCOPY Attending Tessy Potts   Hosp Day # 0 PCP Jonatan Cano DO         PATIENT NAME: Antonio Face  DATE OF OPERATION:

## 2019-01-12 NOTE — PROGRESS NOTES
Patient in endoscopy at time of visit. Per report patient has gastritis. In the setting of a normal gallbladder ultrasound and normal HIDA scan gastritis most likely source of her symptoms. Plan for discharge per GI.

## 2019-01-12 NOTE — PLAN OF CARE
Arelis Esteves returned page, notified of consult, orders received for regular diet, NPO at midnight.

## 2019-01-15 NOTE — PROGRESS NOTES
Here are the biopsy/pathology findings from your recent EGD (Upper  Endoscopy):    Normal stomach biopsies, no infection seen. 1.  Continue Pantoprazole once daily x 1 month. Please let patient know.   [facility]      If you need any further assi

## 2019-01-16 NOTE — PROGRESS NOTES
Call to patient informed of results and recommendations per Dr. Nneka Winchester. Patient verbalized understanding.

## 2019-05-10 PROCEDURE — 87389 HIV-1 AG W/HIV-1&-2 AB AG IA: CPT | Performed by: OBSTETRICS & GYNECOLOGY

## 2019-05-10 PROCEDURE — 86901 BLOOD TYPING SEROLOGIC RH(D): CPT | Performed by: OBSTETRICS & GYNECOLOGY

## 2019-05-10 PROCEDURE — 86850 RBC ANTIBODY SCREEN: CPT | Performed by: OBSTETRICS & GYNECOLOGY

## 2019-05-10 PROCEDURE — 86900 BLOOD TYPING SEROLOGIC ABO: CPT | Performed by: OBSTETRICS & GYNECOLOGY

## 2019-05-24 PROCEDURE — 87086 URINE CULTURE/COLONY COUNT: CPT | Performed by: OBSTETRICS & GYNECOLOGY

## 2019-08-19 ENCOUNTER — HOSPITAL ENCOUNTER (OUTPATIENT)
Facility: HOSPITAL | Age: 29
Setting detail: OBSERVATION
Discharge: HOME | End: 2019-08-19
Attending: OBSTETRICS & GYNECOLOGY | Admitting: OBSTETRICS & GYNECOLOGY
Payer: COMMERCIAL

## 2019-08-19 VITALS
TEMPERATURE: 99 F | HEIGHT: 65 IN | BODY MASS INDEX: 20.49 KG/M2 | HEART RATE: 102 BPM | WEIGHT: 123 LBS | SYSTOLIC BLOOD PRESSURE: 117 MMHG | DIASTOLIC BLOOD PRESSURE: 70 MMHG | RESPIRATION RATE: 16 BRPM

## 2019-08-19 LAB
BILIRUBIN URINE: NEGATIVE
BLOOD URINE: NEGATIVE
CONTROL RUN WITHIN 24 HOURS?: YES
FETAL FIBRINECTIN: NEGATIVE
GLUCOSE URINE: NEGATIVE
KETONE URINE: NEGATIVE
LEUKOCYTE ESTERASE URINE: NEGATIVE
NITRITE URINE: NEGATIVE
PH URINE: 5.5 (ref 5–8)
PROTEIN URINE: NEGATIVE
SPEC GRAVITY: 1.02 (ref 1–1.03)
URINE CLARITY: CLEAR
URINE COLOR: YELLOW
UROBILINOGEN URINE: 0.2

## 2019-08-19 PROCEDURE — 99214 OFFICE O/P EST MOD 30 MIN: CPT

## 2019-08-19 PROCEDURE — 81002 URINALYSIS NONAUTO W/O SCOPE: CPT

## 2019-08-19 PROCEDURE — 82731 ASSAY OF FETAL FIBRONECTIN: CPT | Performed by: OBSTETRICS & GYNECOLOGY

## 2019-08-19 NOTE — PROGRESS NOTES
Pt given all discharge information and pt verbalized understanding to all. Pt is comfortable going home. Pt has next scheduled appt on sept 5th but was told to make an appt for next week for follow up. Pt will call MD with any questions.

## 2019-08-19 NOTE — PROGRESS NOTES
29year old  @ 26  presented to L&D with c/o not feeling well. H/o PTD at 35 weeks. On weekly IM 17OHP. Irritability noted in triage, FFN done (NEG)  Closed / Long per RN  , Mod LTV + accels    A/P  No PTL  Plan home.   Continue we

## 2019-08-19 NOTE — PROGRESS NOTES
Pt is a 29year old female admitted to TRG3/TRG3-A, Patient presents with:  R/o  Labor: pt c/o cramping, pressure since 0430     Pt is 26w4d intra-uterine pregnancy. Denies any leaking of fluid. Reports +fetal movement.  History obtained, consents si

## 2019-08-19 NOTE — NST
Nonstress Test   Patient: Lucía Lawson    Gestation: 79P8U    NST:       Variability: Moderate           Accelerations: No           Decelerations: None            Baseline: 145 BPM           Uterine Irritability: Yes           Contractions: Irregular

## 2019-10-11 ENCOUNTER — HOSPITAL ENCOUNTER (OUTPATIENT)
Facility: HOSPITAL | Age: 29
Setting detail: OBSERVATION
Discharge: HOME | End: 2019-10-11
Attending: OBSTETRICS & GYNECOLOGY | Admitting: OBSTETRICS & GYNECOLOGY
Payer: COMMERCIAL

## 2019-10-11 VITALS
DIASTOLIC BLOOD PRESSURE: 82 MMHG | BODY MASS INDEX: 21.33 KG/M2 | SYSTOLIC BLOOD PRESSURE: 127 MMHG | HEART RATE: 81 BPM | HEIGHT: 65 IN | RESPIRATION RATE: 18 BRPM | TEMPERATURE: 98 F | WEIGHT: 128 LBS

## 2019-10-11 PROCEDURE — 87653 STREP B DNA AMP PROBE: CPT | Performed by: OBSTETRICS & GYNECOLOGY

## 2019-10-11 PROCEDURE — 99213 OFFICE O/P EST LOW 20 MIN: CPT

## 2019-10-11 PROCEDURE — 87081 CULTURE SCREEN ONLY: CPT | Performed by: OBSTETRICS & GYNECOLOGY

## 2019-10-11 PROCEDURE — 59025 FETAL NON-STRESS TEST: CPT

## 2019-10-11 PROCEDURE — 96372 THER/PROPH/DIAG INJ SC/IM: CPT

## 2019-10-11 RX ORDER — BETAMETHASONE SODIUM PHOSPHATE AND BETAMETHASONE ACETATE 3; 3 MG/ML; MG/ML
12 INJECTION, SUSPENSION INTRA-ARTICULAR; INTRALESIONAL; INTRAMUSCULAR; SOFT TISSUE EVERY 24 HOURS
Status: DISCONTINUED | OUTPATIENT
Start: 2019-10-11 | End: 2019-10-11

## 2019-10-11 NOTE — NST
Nonstress Test   Patient: Temo Marques    Gestation: 34w1d    NST:       Variability: Moderate           Accelerations: No           Decelerations: None            Baseline: 130 BPM           Uterine Irritability: Yes           Contractions: Irregular

## 2019-10-11 NOTE — PROGRESS NOTES
Pt is a 34year old female admitted to TRG3/TRG3-A. Pt is  34w1d intra-uterine pregnancy.     Patient presents with:  R/o  Labor: Pt sent from OB office d/t cervical exam 2/-1, will monitor for  contractions     History obtained, c

## 2019-10-12 ENCOUNTER — HOSPITAL ENCOUNTER (OUTPATIENT)
Facility: HOSPITAL | Age: 29
Setting detail: OBSERVATION
Discharge: HOME | End: 2019-10-13
Attending: OBSTETRICS & GYNECOLOGY | Admitting: OBSTETRICS & GYNECOLOGY
Payer: COMMERCIAL

## 2019-10-12 VITALS
HEART RATE: 90 BPM | SYSTOLIC BLOOD PRESSURE: 109 MMHG | RESPIRATION RATE: 16 BRPM | HEIGHT: 66 IN | TEMPERATURE: 98 F | BODY MASS INDEX: 20.41 KG/M2 | WEIGHT: 127 LBS | DIASTOLIC BLOOD PRESSURE: 72 MMHG

## 2019-10-12 PROCEDURE — 96374 THER/PROPH/DIAG INJ IV PUSH: CPT

## 2019-10-12 PROCEDURE — 86850 RBC ANTIBODY SCREEN: CPT | Performed by: OBSTETRICS & GYNECOLOGY

## 2019-10-12 PROCEDURE — 86780 TREPONEMA PALLIDUM: CPT | Performed by: OBSTETRICS & GYNECOLOGY

## 2019-10-12 PROCEDURE — 96372 THER/PROPH/DIAG INJ SC/IM: CPT

## 2019-10-12 PROCEDURE — 86901 BLOOD TYPING SEROLOGIC RH(D): CPT | Performed by: OBSTETRICS & GYNECOLOGY

## 2019-10-12 PROCEDURE — 36415 COLL VENOUS BLD VENIPUNCTURE: CPT

## 2019-10-12 PROCEDURE — 96361 HYDRATE IV INFUSION ADD-ON: CPT

## 2019-10-12 PROCEDURE — 85025 COMPLETE CBC W/AUTO DIFF WBC: CPT | Performed by: OBSTETRICS & GYNECOLOGY

## 2019-10-12 PROCEDURE — 86900 BLOOD TYPING SEROLOGIC ABO: CPT | Performed by: OBSTETRICS & GYNECOLOGY

## 2019-10-12 RX ORDER — SODIUM CHLORIDE, SODIUM LACTATE, POTASSIUM CHLORIDE, CALCIUM CHLORIDE 600; 310; 30; 20 MG/100ML; MG/100ML; MG/100ML; MG/100ML
INJECTION, SOLUTION INTRAVENOUS CONTINUOUS
Status: DISCONTINUED | OUTPATIENT
Start: 2019-10-12 | End: 2019-10-13

## 2019-10-12 RX ORDER — ZOLPIDEM TARTRATE 5 MG/1
5 TABLET ORAL NIGHTLY PRN
Status: DISCONTINUED | OUTPATIENT
Start: 2019-10-12 | End: 2019-10-13

## 2019-10-12 RX ORDER — ACETAMINOPHEN 500 MG
1000 TABLET ORAL EVERY 6 HOURS PRN
Status: DISCONTINUED | OUTPATIENT
Start: 2019-10-12 | End: 2019-10-13

## 2019-10-12 RX ORDER — BETAMETHASONE SODIUM PHOSPHATE AND BETAMETHASONE ACETATE 3; 3 MG/ML; MG/ML
12 INJECTION, SUSPENSION INTRA-ARTICULAR; INTRALESIONAL; INTRAMUSCULAR; SOFT TISSUE ONCE
Status: COMPLETED | OUTPATIENT
Start: 2019-10-12 | End: 2019-10-12

## 2019-10-12 NOTE — H&P
[address] Patient Status:  Inpatient    1990 MRN [id number]   Location [facility] Attending Vianey Cottrell MD   Hosp Day # 0 PCP Jamison Baltazar MD     Date of Admission:  10/12/201 ENDOSCOPY,BIOPSY  1/12/19= Bx: Normal     Family History:   Family History   Problem Relation Age of Onset   • Cancer Maternal Grandmother         Multiple, unkown   • No Known Problems Father    • Other (breast lump benign) Mother    • Heart Disease Neg will be signed at the Hospital. Admission is planned for today. Continue present management. Fermín OSHEA  10/12/2019  8:04 AM

## 2019-10-12 NOTE — NST
Physician Evaluation        NST Interpretation: Reactive    Disposition:   Discharged    Comments: from 10/11/19      [facility]

## 2019-10-12 NOTE — PROGRESS NOTES
Pt assisted up to br-and went to void- bright red blood dripped on floor. Pt denies any s/s of srom. Pt bavk to bed - will assess for srom. Pt in agreement.

## 2019-10-12 NOTE — PROGRESS NOTES
Received the pt to the unit via w/c- pt to triage room and instructed to change. Pt then into the bed that is in the low locked position. efm explained and then applied. Pt is a 35 yo  with an 34.2 week iup. Pt with a hx of 35 week delivery.   Pt c/

## 2019-10-13 PROBLEM — O46.93 VAGINAL BLEEDING IN PREGNANCY, THIRD TRIMESTER: Status: ACTIVE | Noted: 2019-10-13

## 2019-10-13 PROBLEM — Z34.90 PREGNANCY: Status: RESOLVED | Noted: 2018-09-02 | Resolved: 2019-10-13

## 2019-10-13 PROBLEM — Z34.90 PREGNANCY (HCC): Status: RESOLVED | Noted: 2018-09-02 | Resolved: 2019-10-13

## 2019-10-13 PROBLEM — O46.93 VAGINAL BLEEDING IN PREGNANCY, THIRD TRIMESTER (HCC): Status: ACTIVE | Noted: 2019-10-13

## 2019-10-13 PROCEDURE — 99214 OFFICE O/P EST MOD 30 MIN: CPT

## 2019-10-13 RX ORDER — ALPRAZOLAM 0.25 MG/1
0.25 TABLET ORAL NIGHTLY PRN
Status: DISCONTINUED | OUTPATIENT
Start: 2019-10-13 | End: 2019-10-13

## 2019-10-13 RX ORDER — ONDANSETRON 2 MG/ML
4 INJECTION INTRAMUSCULAR; INTRAVENOUS EVERY 6 HOURS PRN
Status: DISCONTINUED | OUTPATIENT
Start: 2019-10-13 | End: 2019-10-13

## 2019-10-13 RX ORDER — FAMOTIDINE 10 MG/ML
20 INJECTION, SOLUTION INTRAVENOUS 2 TIMES DAILY PRN
Status: DISCONTINUED | OUTPATIENT
Start: 2019-10-13 | End: 2019-10-13

## 2019-10-13 RX ORDER — ONDANSETRON 2 MG/ML
INJECTION INTRAMUSCULAR; INTRAVENOUS
Status: COMPLETED
Start: 2019-10-13 | End: 2019-10-13

## 2019-10-13 NOTE — PROGRESS NOTES
Patient discharged home in stable condition via wheelchair with written discharge instructions. All instructions explained to patient. Patient verbalizes understanding. All questions answered at this time.

## 2019-10-15 ENCOUNTER — APPOINTMENT (OUTPATIENT)
Dept: ULTRASOUND IMAGING | Facility: HOSPITAL | Age: 29
End: 2019-10-15
Attending: OBSTETRICS & GYNECOLOGY
Payer: COMMERCIAL

## 2019-10-15 ENCOUNTER — HOSPITAL ENCOUNTER (OUTPATIENT)
Facility: HOSPITAL | Age: 29
Setting detail: OBSERVATION
Discharge: HOME | End: 2019-10-15
Attending: OBSTETRICS & GYNECOLOGY | Admitting: OBSTETRICS & GYNECOLOGY
Payer: COMMERCIAL

## 2019-10-15 VITALS
DIASTOLIC BLOOD PRESSURE: 71 MMHG | WEIGHT: 128 LBS | HEIGHT: 65 IN | SYSTOLIC BLOOD PRESSURE: 110 MMHG | HEART RATE: 90 BPM | BODY MASS INDEX: 21.33 KG/M2 | RESPIRATION RATE: 18 BRPM | TEMPERATURE: 98 F

## 2019-10-15 PROCEDURE — 76815 OB US LIMITED FETUS(S): CPT | Performed by: OBSTETRICS & GYNECOLOGY

## 2019-10-15 PROCEDURE — 99214 OFFICE O/P EST MOD 30 MIN: CPT

## 2019-10-15 PROCEDURE — 59025 FETAL NON-STRESS TEST: CPT

## 2019-10-15 NOTE — PROGRESS NOTES
Pt is a 34year old female admitted to TRG3/TRG3-A. Patient presents with:  Non-stress Test  R/o  Labor: 4cm in office per Dr Sandrine Burnett     Pt is  34w5d intra-uterine pregnancy. History obtained, consents signed.  Oriented to room, staff, and

## 2019-10-15 NOTE — NST
Nonstress Test   Patient: Solo Maya    Gestation: 34w5d    NST:       Variability: Moderate           Accelerations: Yes           Decelerations: None            Baseline: 120 BPM           Uterine Irritability: No           Contractions: Irregular

## 2019-10-17 NOTE — NST
Physician Evaluation        NST Interpretation: Reactive    Disposition:   Discharged    Comments: reassuring      False Pass Estimable

## 2019-11-01 ENCOUNTER — HOSPITAL ENCOUNTER (OUTPATIENT)
Facility: HOSPITAL | Age: 29
Setting detail: OBSERVATION
Discharge: HOME | End: 2019-11-01
Attending: OBSTETRICS & GYNECOLOGY | Admitting: OBSTETRICS & GYNECOLOGY
Payer: COMMERCIAL

## 2019-11-01 PROBLEM — Z34.90 PREGNANCY (HCC): Status: ACTIVE | Noted: 2019-11-01

## 2019-11-01 PROBLEM — Z34.90 PREGNANCY: Status: ACTIVE | Noted: 2019-11-01

## 2019-11-01 PROCEDURE — 99213 OFFICE O/P EST LOW 20 MIN: CPT

## 2019-11-01 PROCEDURE — 59025 FETAL NON-STRESS TEST: CPT

## 2019-11-01 NOTE — NST
Nonstress Test   Patient: Navarro Kruger    Gestation: 37w1d    NST:       Variability: Moderate           Accelerations: Yes           Decelerations: None            Baseline: 140 BPM           Uterine Irritability: Yes           Contractions: Irregular

## 2019-11-01 NOTE — PROGRESS NOTES
Pt brought to triage with compllaints of lower abdominal pressure off and on since Tuesday of this week. Pt states had sve on Monday and it was noted that she wad 4 cm. Pt with history of last baby delivered at 27 weeks.   Pt appears comfortable at this ti

## 2019-11-01 NOTE — PROGRESS NOTES
Per Dr Adelaide Crawford, pt may ambulate 1-2 hours to see if contractions become stronger or go home, whichever she feels most comfortable in doing since sve has not changed since Monday. Pt and  discussed it in private and then decided that she will go home.

## 2019-11-13 ENCOUNTER — HOSPITAL ENCOUNTER (INPATIENT)
Facility: HOSPITAL | Age: 29
LOS: 2 days | Discharge: HOME | End: 2019-11-15
Attending: OBSTETRICS & GYNECOLOGY | Admitting: OBSTETRICS & GYNECOLOGY
Payer: COMMERCIAL

## 2019-11-13 ENCOUNTER — TELEPHONE (OUTPATIENT)
Dept: OBGYN UNIT | Facility: HOSPITAL | Age: 29
End: 2019-11-13

## 2019-11-13 PROBLEM — Z34.90 PREGNANT: Status: ACTIVE | Noted: 2019-11-13

## 2019-11-13 PROBLEM — Z34.90 PREGNANT (HCC): Status: ACTIVE | Noted: 2019-11-13

## 2019-11-13 PROCEDURE — 86850 RBC ANTIBODY SCREEN: CPT | Performed by: OBSTETRICS & GYNECOLOGY

## 2019-11-13 PROCEDURE — 0UQMXZZ REPAIR VULVA, EXTERNAL APPROACH: ICD-10-PCS | Performed by: OBSTETRICS & GYNECOLOGY

## 2019-11-13 PROCEDURE — 86900 BLOOD TYPING SEROLOGIC ABO: CPT | Performed by: OBSTETRICS & GYNECOLOGY

## 2019-11-13 PROCEDURE — 99214 OFFICE O/P EST MOD 30 MIN: CPT

## 2019-11-13 PROCEDURE — 86901 BLOOD TYPING SEROLOGIC RH(D): CPT | Performed by: OBSTETRICS & GYNECOLOGY

## 2019-11-13 PROCEDURE — 86780 TREPONEMA PALLIDUM: CPT | Performed by: OBSTETRICS & GYNECOLOGY

## 2019-11-13 PROCEDURE — 85027 COMPLETE CBC AUTOMATED: CPT | Performed by: OBSTETRICS & GYNECOLOGY

## 2019-11-13 RX ORDER — TRISODIUM CITRATE DIHYDRATE AND CITRIC ACID MONOHYDRATE 500; 334 MG/5ML; MG/5ML
30 SOLUTION ORAL AS NEEDED
Status: DISCONTINUED | OUTPATIENT
Start: 2019-11-13 | End: 2019-11-14 | Stop reason: HOSPADM

## 2019-11-13 RX ORDER — NALBUPHINE HCL 10 MG/ML
2.5 AMPUL (ML) INJECTION
Status: DISCONTINUED | OUTPATIENT
Start: 2019-11-13 | End: 2019-11-14

## 2019-11-13 RX ORDER — DEXTROSE, SODIUM CHLORIDE, SODIUM LACTATE, POTASSIUM CHLORIDE, AND CALCIUM CHLORIDE 5; .6; .31; .03; .02 G/100ML; G/100ML; G/100ML; G/100ML; G/100ML
INJECTION, SOLUTION INTRAVENOUS AS NEEDED
Status: DISCONTINUED | OUTPATIENT
Start: 2019-11-13 | End: 2019-11-14 | Stop reason: HOSPADM

## 2019-11-13 RX ORDER — IBUPROFEN 600 MG/1
600 TABLET ORAL ONCE AS NEEDED
Status: DISCONTINUED | OUTPATIENT
Start: 2019-11-13 | End: 2019-11-14 | Stop reason: HOSPADM

## 2019-11-13 RX ORDER — TERBUTALINE SULFATE 1 MG/ML
0.25 INJECTION, SOLUTION SUBCUTANEOUS AS NEEDED
Status: DISCONTINUED | OUTPATIENT
Start: 2019-11-13 | End: 2019-11-14 | Stop reason: HOSPADM

## 2019-11-13 RX ORDER — EPHEDRINE SULFATE/0.9% NACL/PF 25 MG/5 ML
5 SYRINGE (ML) INTRAVENOUS AS NEEDED
Status: DISCONTINUED | OUTPATIENT
Start: 2019-11-13 | End: 2019-11-14

## 2019-11-13 RX ORDER — AMMONIA INHALANTS 0.04 G/.3ML
0.3 INHALANT RESPIRATORY (INHALATION) AS NEEDED
Status: DISCONTINUED | OUTPATIENT
Start: 2019-11-13 | End: 2019-11-14 | Stop reason: HOSPADM

## 2019-11-13 RX ORDER — SODIUM CHLORIDE, SODIUM LACTATE, POTASSIUM CHLORIDE, CALCIUM CHLORIDE 600; 310; 30; 20 MG/100ML; MG/100ML; MG/100ML; MG/100ML
INJECTION, SOLUTION INTRAVENOUS CONTINUOUS
Status: DISCONTINUED | OUTPATIENT
Start: 2019-11-13 | End: 2019-11-14 | Stop reason: HOSPADM

## 2019-11-13 RX ADMIN — LIDOCAINE HYDROCHLORIDE AND EPINEPHRINE 3 ML: 15; 5 INJECTION, SOLUTION EPIDURAL at 22:35:00

## 2019-11-13 RX ADMIN — LIDOCAINE HYDROCHLORIDE 5 MG: 10 INJECTION, SOLUTION EPIDURAL; INFILTRATION; INTRACAUDAL; PERINEURAL at 22:34:00

## 2019-11-14 ENCOUNTER — ANESTHESIA (OUTPATIENT)
Dept: OBGYN UNIT | Facility: HOSPITAL | Age: 29
End: 2019-11-14
Payer: COMMERCIAL

## 2019-11-14 ENCOUNTER — ANESTHESIA EVENT (OUTPATIENT)
Dept: OBGYN UNIT | Facility: HOSPITAL | Age: 29
End: 2019-11-14
Payer: COMMERCIAL

## 2019-11-14 PROCEDURE — 85025 COMPLETE CBC W/AUTO DIFF WBC: CPT | Performed by: OBSTETRICS & GYNECOLOGY

## 2019-11-14 RX ORDER — ZOLPIDEM TARTRATE 5 MG/1
5 TABLET ORAL NIGHTLY PRN
Status: DISCONTINUED | OUTPATIENT
Start: 2019-11-14 | End: 2019-11-15

## 2019-11-14 RX ORDER — BISACODYL 10 MG
10 SUPPOSITORY, RECTAL RECTAL ONCE AS NEEDED
Status: DISCONTINUED | OUTPATIENT
Start: 2019-11-14 | End: 2019-11-15

## 2019-11-14 RX ORDER — ACETAMINOPHEN 325 MG/1
650 TABLET ORAL EVERY 6 HOURS PRN
Status: DISCONTINUED | OUTPATIENT
Start: 2019-11-14 | End: 2019-11-15

## 2019-11-14 RX ORDER — IBUPROFEN 600 MG/1
600 TABLET ORAL EVERY 6 HOURS
Status: DISCONTINUED | OUTPATIENT
Start: 2019-11-14 | End: 2019-11-15

## 2019-11-14 RX ORDER — LIDOCAINE HYDROCHLORIDE 10 MG/ML
INJECTION, SOLUTION EPIDURAL; INFILTRATION; INTRACAUDAL; PERINEURAL AS NEEDED
Status: DISCONTINUED | OUTPATIENT
Start: 2019-11-13 | End: 2019-11-14 | Stop reason: SURG

## 2019-11-14 RX ORDER — DOCUSATE SODIUM 100 MG/1
100 CAPSULE, LIQUID FILLED ORAL
Status: DISCONTINUED | OUTPATIENT
Start: 2019-11-14 | End: 2019-11-15

## 2019-11-14 RX ORDER — SIMETHICONE 80 MG
80 TABLET,CHEWABLE ORAL 3 TIMES DAILY PRN
Status: DISCONTINUED | OUTPATIENT
Start: 2019-11-14 | End: 2019-11-15

## 2019-11-14 RX ORDER — LIDOCAINE HYDROCHLORIDE AND EPINEPHRINE 15; 5 MG/ML; UG/ML
INJECTION, SOLUTION EPIDURAL AS NEEDED
Status: DISCONTINUED | OUTPATIENT
Start: 2019-11-13 | End: 2019-11-14 | Stop reason: SURG

## 2019-11-14 NOTE — L&D DELIVERY NOTE
Tiara Polk [[id number]]    Labor Events     labor?:  No   steroids?:  Full Course  Antibiotics received during labor?:  Yes  Antibiotics (enter # doses in comment):  ampicillin       Rupture type:  Bulging     Labor Event Times    Lab perineum - there was an extremely tight nuchal cord X2 - not reducible - had to wiggle by finger under the cord to be able to get a clamp placed - cord clamped and cut and unwound from the baby's neck. Rest of the infant delivered easily.  Vigorous liveborn

## 2019-11-14 NOTE — H&P
Pt is 28 y/o  at 38w6 d who presents with c/o ctx's - found to be 6 cm/100/+1    1st baby  - delivered at 35 weeks - was using 17-OHP this pg     H/O anxiety     Received steroids about 33 weeks     + GBS      Nl 1 hour glucose  FHT's - cat 1  VE C/

## 2019-11-14 NOTE — ANESTHESIA PREPROCEDURE EVALUATION
PRE-OP EVALUATION    Patient Name: Tyler Edge    Pre-op Diagnosis: * No surgery found *    * No surgery found *    * Surgery not found *    Pre-op vitals reviewed.   Temp: 97.4 °F (36.3 °C)  Pulse: 78  Resp: 17  BP: 122/76  SpO2: 98 %  Body mass index i Past Surgical History:   Procedure Laterality Date   • CHOLECYSTECTOMY  01/31/2019   • ESOPHAGOGASTRODUODENOSCOPY (EGD) N/A 1/12/2019    Performed by Haroon Winters MD at [address] [place] [address] ENDOSCOPY   • OTHER SURGICAL HISTORY  07/2019    oral surgery    • UP

## 2019-11-14 NOTE — ANESTHESIA PROCEDURE NOTES
Labor Analgesia  Performed by: Liliana Angelo MD  Authorized by: Liliana Angelo MD       General Information and Staff    Start Time:  11/14/2019 10:20 PM  End Time:  11/14/2019 10:34 PM  Anesthesiologist:  Liliana Angelo MD  Performed by:   Anesthesiologist

## 2019-11-14 NOTE — PROGRESS NOTES
Pt stable. Pt moved from 115 to rm 1106 in wheelchair. Pt holding infant during transfer.  and belongings moved with pt. Report given to [facility] to assume pt care. ID bands verified. Hugs/kisses remain in place.

## 2019-11-14 NOTE — PROGRESS NOTES
Pt is a 34year old female admitted to 115/115-A. Patient presents with:  R/o Labor: Patient c/o contractions every 4-5 minutes since 1900. Patient denies leaking of fluid or vaginal bleeding. +fetal movement.  patient was 5cm in the office yesterday and

## 2019-11-15 VITALS
BODY MASS INDEX: 22.66 KG/M2 | DIASTOLIC BLOOD PRESSURE: 84 MMHG | HEIGHT: 65 IN | RESPIRATION RATE: 19 BRPM | WEIGHT: 136 LBS | OXYGEN SATURATION: 98 % | HEART RATE: 78 BPM | TEMPERATURE: 99 F | SYSTOLIC BLOOD PRESSURE: 124 MMHG

## 2019-11-15 PROBLEM — O46.93 VAGINAL BLEEDING IN PREGNANCY, THIRD TRIMESTER (HCC): Status: RESOLVED | Noted: 2019-10-13 | Resolved: 2019-11-13

## 2019-11-15 PROBLEM — O46.93 VAGINAL BLEEDING IN PREGNANCY, THIRD TRIMESTER: Status: RESOLVED | Noted: 2019-10-13 | Resolved: 2019-11-13

## 2019-11-15 NOTE — PROGRESS NOTES
OB Progress Note PPD#2    S: Feels well. Ambulating, eating. Pain controlled. Lochia mild. Breastfeeding. Voiding without difficulty, + flatus,   O:   Blood pressure 124/84, pulse 78, temperature 98.7 °F (37.1 °C), temperature source Oral, resp.  rate 19,

## 2019-11-18 ENCOUNTER — TELEPHONE (OUTPATIENT)
Dept: OBGYN UNIT | Facility: HOSPITAL | Age: 29
End: 2019-11-18

## 2019-11-18 ENCOUNTER — HOSPITAL ENCOUNTER (EMERGENCY)
Facility: HOSPITAL | Age: 29
Discharge: HOME | End: 2019-11-18
Attending: EMERGENCY MEDICINE
Payer: COMMERCIAL

## 2019-11-18 VITALS
TEMPERATURE: 98 F | HEART RATE: 62 BPM | WEIGHT: 120 LBS | SYSTOLIC BLOOD PRESSURE: 112 MMHG | RESPIRATION RATE: 16 BRPM | BODY MASS INDEX: 19.29 KG/M2 | DIASTOLIC BLOOD PRESSURE: 78 MMHG | HEIGHT: 66 IN | OXYGEN SATURATION: 99 %

## 2019-11-18 DIAGNOSIS — R51.9 FRONTAL HEADACHE: Primary | ICD-10-CM

## 2019-11-18 PROCEDURE — 96375 TX/PRO/DX INJ NEW DRUG ADDON: CPT

## 2019-11-18 PROCEDURE — 96361 HYDRATE IV INFUSION ADD-ON: CPT

## 2019-11-18 PROCEDURE — 96374 THER/PROPH/DIAG INJ IV PUSH: CPT

## 2019-11-18 PROCEDURE — 99284 EMERGENCY DEPT VISIT MOD MDM: CPT

## 2019-11-18 RX ORDER — DIPHENHYDRAMINE HYDROCHLORIDE 50 MG/ML
25 INJECTION INTRAMUSCULAR; INTRAVENOUS ONCE
Status: COMPLETED | OUTPATIENT
Start: 2019-11-18 | End: 2019-11-18

## 2019-11-18 RX ORDER — KETOROLAC TROMETHAMINE 30 MG/ML
15 INJECTION, SOLUTION INTRAMUSCULAR; INTRAVENOUS ONCE
Status: COMPLETED | OUTPATIENT
Start: 2019-11-18 | End: 2019-11-18

## 2019-11-18 RX ORDER — ONDANSETRON 2 MG/ML
4 INJECTION INTRAMUSCULAR; INTRAVENOUS ONCE
Status: COMPLETED | OUTPATIENT
Start: 2019-11-18 | End: 2019-11-18

## 2019-11-18 NOTE — ED INITIAL ASSESSMENT (HPI)
Pt postpartum 5 days. with headache that started last night. Pt had epidural with delivery. No complications with pregnancy. History of migraines. Pt took tylenol over night. fiornal this am without improvement.

## 2019-11-18 NOTE — ED PROVIDER NOTES
Patient Seen in: [facility] Emergency Department      History   Patient presents with:  Headache (neurologic)    Stated Complaint: headache, 5 days post partum, had epidural, pain started last night    HPI    Patient is a pleasant 22-year-old female 97.5 °F (36.4 °C)   Temp src Temporal   SpO2 96 %   O2 Device None (Room air)       Current:/78   Pulse 62   Temp 97.5 °F (36.4 °C) (Temporal)   Resp 16   Ht 167.6 cm (5' 6\")   Wt 54.4 kg   SpO2 99%   Breastfeeding Yes   BMI 19.37 kg/m²         Phys discussed. Patient verbalizes understanding and is comfortable with the plan as recommended.  is at the cart side and supports the plan. Patient ambulated freely and was subsequently discharged without incident.       Disposition and Plan

## 2019-11-19 ENCOUNTER — TELEPHONE (OUTPATIENT)
Dept: OBGYN UNIT | Facility: HOSPITAL | Age: 29
End: 2019-11-19

## 2019-12-10 NOTE — ANESTHESIA POSTPROCEDURE EVALUATION
[address]  Patient Status:  Inpatient   Age/Gender 34year old female MRN [id number]   [facility] 1SW-J Attending No att. providers found   [facility] Day # 2 PCP Babak Barahona MD       Anesthesia Post-op Note    * No procedures

## 2019-12-10 NOTE — ANESTHESIA POSTPROCEDURE EVALUATION
[address] Patient Status:  Inpatient   Age/Gender 34year old female MRN [id number]   [facility] 1SW-J Attending No att. providers found   [facility] Day # 2 PCP Yoan Tejada MD       Anesthesia Post-op Note    * No procedures

## 2020-02-19 PROBLEM — K81.0 ACUTE CHOLECYSTITIS: Status: RESOLVED | Noted: 2019-01-10 | Resolved: 2020-02-19

## 2020-07-08 PROBLEM — R11.2 NON-INTRACTABLE VOMITING WITH NAUSEA, UNSPECIFIED VOMITING TYPE: Status: ACTIVE | Noted: 2020-07-08

## 2020-07-08 PROBLEM — R10.13 DYSPEPSIA: Status: ACTIVE | Noted: 2020-07-08

## 2020-07-09 ENCOUNTER — HOSPITAL ENCOUNTER (EMERGENCY)
Facility: HOSPITAL | Age: 30
Discharge: HOME | End: 2020-07-10
Attending: EMERGENCY MEDICINE
Payer: COMMERCIAL

## 2020-07-09 DIAGNOSIS — R10.13 ABDOMINAL PAIN, EPIGASTRIC: Primary | ICD-10-CM

## 2020-07-09 DIAGNOSIS — R10.13 DYSPEPSIA: ICD-10-CM

## 2020-07-09 PROCEDURE — 96374 THER/PROPH/DIAG INJ IV PUSH: CPT

## 2020-07-09 PROCEDURE — 96361 HYDRATE IV INFUSION ADD-ON: CPT

## 2020-07-09 PROCEDURE — 99284 EMERGENCY DEPT VISIT MOD MDM: CPT

## 2020-07-09 PROCEDURE — 96375 TX/PRO/DX INJ NEW DRUG ADDON: CPT

## 2020-07-10 VITALS
OXYGEN SATURATION: 99 % | SYSTOLIC BLOOD PRESSURE: 110 MMHG | HEART RATE: 89 BPM | DIASTOLIC BLOOD PRESSURE: 85 MMHG | RESPIRATION RATE: 13 BRPM | TEMPERATURE: 98 F

## 2020-07-10 LAB
ALBUMIN SERPL-MCNC: 4 G/DL (ref 3.4–5)
ALBUMIN/GLOB SERPL: 1.3 {RATIO} (ref 1–2)
ALP LIVER SERPL-CCNC: 52 U/L (ref 37–98)
ALT SERPL-CCNC: 20 U/L (ref 13–56)
ANION GAP SERPL CALC-SCNC: 3 MMOL/L (ref 0–18)
AST SERPL-CCNC: 13 U/L (ref 15–37)
BASOPHILS # BLD AUTO: 0.04 X10(3) UL (ref 0–0.2)
BASOPHILS NFR BLD AUTO: 0.7 %
BILIRUB SERPL-MCNC: 0.4 MG/DL (ref 0.1–2)
BILIRUB UR QL STRIP.AUTO: NEGATIVE
BUN BLD-MCNC: 10 MG/DL (ref 7–18)
BUN/CREAT SERPL: 12.2 (ref 10–20)
CALCIUM BLD-MCNC: 9.3 MG/DL (ref 8.5–10.1)
CHLORIDE SERPL-SCNC: 106 MMOL/L (ref 98–112)
CLARITY UR REFRACT.AUTO: CLEAR
CO2 SERPL-SCNC: 29 MMOL/L (ref 21–32)
COLOR UR AUTO: COLORLESS
CREAT BLD-MCNC: 0.82 MG/DL (ref 0.55–1.02)
DEPRECATED RDW RBC AUTO: 38.2 FL (ref 35.1–46.3)
EOSINOPHIL # BLD AUTO: 0.1 X10(3) UL (ref 0–0.7)
EOSINOPHIL NFR BLD AUTO: 1.7 %
ERYTHROCYTE [DISTWIDTH] IN BLOOD BY AUTOMATED COUNT: 11.2 % (ref 11–15)
GLOBULIN PLAS-MCNC: 3.2 G/DL (ref 2.8–4.4)
GLUCOSE BLD-MCNC: 122 MG/DL (ref 70–99)
GLUCOSE UR STRIP.AUTO-MCNC: NEGATIVE MG/DL
HCT VFR BLD AUTO: 37.8 % (ref 35–48)
HGB BLD-MCNC: 13 G/DL (ref 12–16)
IMM GRANULOCYTES # BLD AUTO: 0.01 X10(3) UL (ref 0–1)
IMM GRANULOCYTES NFR BLD: 0.2 %
KETONES UR STRIP.AUTO-MCNC: NEGATIVE MG/DL
LEUKOCYTE ESTERASE UR QL STRIP.AUTO: NEGATIVE
LIPASE SERPL-CCNC: 110 U/L (ref 73–393)
LYMPHOCYTES # BLD AUTO: 2.25 X10(3) UL (ref 1–4)
LYMPHOCYTES NFR BLD AUTO: 37.2 %
M PROTEIN MFR SERPL ELPH: 7.2 G/DL (ref 6.4–8.2)
MCH RBC QN AUTO: 32.3 PG (ref 26–34)
MCHC RBC AUTO-ENTMCNC: 34.4 G/DL (ref 31–37)
MCV RBC AUTO: 94 FL (ref 80–100)
MONOCYTES # BLD AUTO: 0.5 X10(3) UL (ref 0.1–1)
MONOCYTES NFR BLD AUTO: 8.3 %
NEUTROPHILS # BLD AUTO: 3.15 X10 (3) UL (ref 1.5–7.7)
NEUTROPHILS # BLD AUTO: 3.15 X10(3) UL (ref 1.5–7.7)
NEUTROPHILS NFR BLD AUTO: 51.9 %
NITRITE UR QL STRIP.AUTO: NEGATIVE
OSMOLALITY SERPL CALC.SUM OF ELEC: 286 MOSM/KG (ref 275–295)
PH UR STRIP.AUTO: 6 [PH] (ref 4.5–8)
PLATELET # BLD AUTO: 245 10(3)UL (ref 150–450)
POTASSIUM SERPL-SCNC: 3.6 MMOL/L (ref 3.5–5.1)
PROT UR STRIP.AUTO-MCNC: NEGATIVE MG/DL
RBC # BLD AUTO: 4.02 X10(6)UL (ref 3.8–5.3)
RBC UR QL AUTO: NEGATIVE
SODIUM SERPL-SCNC: 138 MMOL/L (ref 136–145)
SP GR UR STRIP.AUTO: <1.005 (ref 1–1.03)
UROBILINOGEN UR STRIP.AUTO-MCNC: <2 MG/DL
WBC # BLD AUTO: 6.1 X10(3) UL (ref 4–11)

## 2020-07-10 PROCEDURE — 83690 ASSAY OF LIPASE: CPT | Performed by: EMERGENCY MEDICINE

## 2020-07-10 PROCEDURE — 80053 COMPREHEN METABOLIC PANEL: CPT | Performed by: EMERGENCY MEDICINE

## 2020-07-10 PROCEDURE — 81003 URINALYSIS AUTO W/O SCOPE: CPT | Performed by: EMERGENCY MEDICINE

## 2020-07-10 PROCEDURE — 85025 COMPLETE CBC W/AUTO DIFF WBC: CPT | Performed by: EMERGENCY MEDICINE

## 2020-07-10 RX ORDER — ONDANSETRON 2 MG/ML
4 INJECTION INTRAMUSCULAR; INTRAVENOUS ONCE
Status: COMPLETED | OUTPATIENT
Start: 2020-07-10 | End: 2020-07-10

## 2020-07-10 RX ORDER — HYDROCODONE BITARTRATE AND ACETAMINOPHEN 5; 325 MG/1; MG/1
1-2 TABLET ORAL EVERY 6 HOURS PRN
Qty: 10 TABLET | Refills: 0 | Status: SHIPPED | OUTPATIENT
Start: 2020-07-10 | End: 2020-07-12

## 2020-07-10 RX ORDER — MORPHINE SULFATE 4 MG/ML
4 INJECTION, SOLUTION INTRAMUSCULAR; INTRAVENOUS ONCE
Status: COMPLETED | OUTPATIENT
Start: 2020-07-10 | End: 2020-07-10

## 2020-07-10 NOTE — ED PROVIDER NOTES
Patient Seen in: [facility] Emergency Department      History   Patient presents with:  Abdomen/Flank Pain    Stated Complaint: abd pain    HPI    77-year-old female presents emergency department with upper abdominal pain for 2 weeks with vomiting. Alcohol/week: 0.0 standard drinks      Comment: occasional    Drug use: Not Currently      Types: Cannabis      Comment: Past use              Review of Systems    Positive for stated complaint: abd pain  Other systems are as noted in HPI.   Constitutional or motor abnormality.       ED Course     Labs Reviewed   COMP METABOLIC PANEL (14) - Abnormal; Notable for the following components:       Result Value    Glucose 122 (*)     AST 13 (*)     All other components within normal limits   URINALYSIS WITH CULTUR PANCREAS: The pancreas is grossly normal in size and homogeneous. There is no pancreatic ductal dilatation. There may be a pancreatic divisum. SPLEEN: The spleen is grossly normal in size and homogeneous without focal solid or cystic lesions.  ADRENAL GLAND discharged in good condition.                 Disposition and Plan     Clinical Impression:  Abdominal pain, epigastric  (primary encounter diagnosis)  Dyspepsia    Disposition:  Discharge  7/10/2020  1:53 am    Follow-up:  Damien Stokes MD  [address]

## 2020-07-10 NOTE — ED INITIAL ASSESSMENT (HPI)
Pt presents with upper abd pain x2 weeks with vomiting. Went to PCP dx with pancreatitis. Pt states the pain has intensified today.

## 2020-07-25 ENCOUNTER — APPOINTMENT (OUTPATIENT)
Dept: CT IMAGING | Facility: HOSPITAL | Age: 30
End: 2020-07-25
Attending: EMERGENCY MEDICINE
Payer: COMMERCIAL

## 2020-07-25 ENCOUNTER — HOSPITAL ENCOUNTER (EMERGENCY)
Facility: HOSPITAL | Age: 30
Discharge: HOME | End: 2020-07-25
Attending: EMERGENCY MEDICINE
Payer: COMMERCIAL

## 2020-07-25 VITALS
WEIGHT: 110 LBS | DIASTOLIC BLOOD PRESSURE: 69 MMHG | HEART RATE: 61 BPM | TEMPERATURE: 99 F | BODY MASS INDEX: 17.68 KG/M2 | OXYGEN SATURATION: 100 % | SYSTOLIC BLOOD PRESSURE: 113 MMHG | RESPIRATION RATE: 19 BRPM | HEIGHT: 66 IN

## 2020-07-25 DIAGNOSIS — R10.13 ABDOMINAL PAIN, EPIGASTRIC: Primary | ICD-10-CM

## 2020-07-25 DIAGNOSIS — K59.00 CONSTIPATION, UNSPECIFIED CONSTIPATION TYPE: ICD-10-CM

## 2020-07-25 LAB
ALBUMIN SERPL-MCNC: 3.9 G/DL (ref 3.4–5)
ALBUMIN/GLOB SERPL: 1.1 {RATIO} (ref 1–2)
ALP LIVER SERPL-CCNC: 59 U/L (ref 37–98)
ALT SERPL-CCNC: 52 U/L (ref 13–56)
ANION GAP SERPL CALC-SCNC: 4 MMOL/L (ref 0–18)
AST SERPL-CCNC: 24 U/L (ref 15–37)
BASOPHILS # BLD AUTO: 0.02 X10(3) UL (ref 0–0.2)
BASOPHILS NFR BLD AUTO: 0.3 %
BILIRUB SERPL-MCNC: 0.2 MG/DL (ref 0.1–2)
BILIRUB UR QL STRIP.AUTO: NEGATIVE
BUN BLD-MCNC: 12 MG/DL (ref 7–18)
BUN/CREAT SERPL: 14.8 (ref 10–20)
CALCIUM BLD-MCNC: 9.1 MG/DL (ref 8.5–10.1)
CHLORIDE SERPL-SCNC: 109 MMOL/L (ref 98–112)
CLARITY UR REFRACT.AUTO: CLEAR
CO2 SERPL-SCNC: 29 MMOL/L (ref 21–32)
COLOR UR AUTO: YELLOW
CREAT BLD-MCNC: 0.81 MG/DL (ref 0.55–1.02)
DEPRECATED RDW RBC AUTO: 41.8 FL (ref 35.1–46.3)
EOSINOPHIL # BLD AUTO: 0.1 X10(3) UL (ref 0–0.7)
EOSINOPHIL NFR BLD AUTO: 1.7 %
ERYTHROCYTE [DISTWIDTH] IN BLOOD BY AUTOMATED COUNT: 11.7 % (ref 11–15)
GLOBULIN PLAS-MCNC: 3.4 G/DL (ref 2.8–4.4)
GLUCOSE BLD-MCNC: 121 MG/DL (ref 70–99)
GLUCOSE UR STRIP.AUTO-MCNC: NEGATIVE MG/DL
HCT VFR BLD AUTO: 40.1 % (ref 35–48)
HGB BLD-MCNC: 13.2 G/DL (ref 12–16)
IMM GRANULOCYTES # BLD AUTO: 0 X10(3) UL (ref 0–1)
IMM GRANULOCYTES NFR BLD: 0 %
KETONES UR STRIP.AUTO-MCNC: NEGATIVE MG/DL
LEUKOCYTE ESTERASE UR QL STRIP.AUTO: NEGATIVE
LIPASE SERPL-CCNC: 116 U/L (ref 73–393)
LYMPHOCYTES # BLD AUTO: 2.34 X10(3) UL (ref 1–4)
LYMPHOCYTES NFR BLD AUTO: 39.6 %
M PROTEIN MFR SERPL ELPH: 7.3 G/DL (ref 6.4–8.2)
MCH RBC QN AUTO: 32 PG (ref 26–34)
MCHC RBC AUTO-ENTMCNC: 32.9 G/DL (ref 31–37)
MCV RBC AUTO: 97.3 FL (ref 80–100)
MONOCYTES # BLD AUTO: 0.46 X10(3) UL (ref 0.1–1)
MONOCYTES NFR BLD AUTO: 7.8 %
NEUTROPHILS # BLD AUTO: 2.99 X10 (3) UL (ref 1.5–7.7)
NEUTROPHILS # BLD AUTO: 2.99 X10(3) UL (ref 1.5–7.7)
NEUTROPHILS NFR BLD AUTO: 50.6 %
NITRITE UR QL STRIP.AUTO: NEGATIVE
OSMOLALITY SERPL CALC.SUM OF ELEC: 295 MOSM/KG (ref 275–295)
PH UR STRIP.AUTO: 5 [PH] (ref 4.5–8)
PLATELET # BLD AUTO: 285 10(3)UL (ref 150–450)
POCT LOT NUMBER: NORMAL
POCT URINE PREGNANCY: NEGATIVE
POTASSIUM SERPL-SCNC: 3.6 MMOL/L (ref 3.5–5.1)
PROT UR STRIP.AUTO-MCNC: NEGATIVE MG/DL
RBC # BLD AUTO: 4.12 X10(6)UL (ref 3.8–5.3)
RBC UR QL AUTO: NEGATIVE
SARS-COV-2 RNA RESP QL NAA+PROBE: NOT DETECTED
SODIUM SERPL-SCNC: 142 MMOL/L (ref 136–145)
SP GR UR STRIP.AUTO: 1.02 (ref 1–1.03)
UROBILINOGEN UR STRIP.AUTO-MCNC: <2 MG/DL
WBC # BLD AUTO: 5.9 X10(3) UL (ref 4–11)

## 2020-07-25 PROCEDURE — 85025 COMPLETE CBC W/AUTO DIFF WBC: CPT | Performed by: EMERGENCY MEDICINE

## 2020-07-25 PROCEDURE — 81003 URINALYSIS AUTO W/O SCOPE: CPT | Performed by: EMERGENCY MEDICINE

## 2020-07-25 PROCEDURE — 99284 EMERGENCY DEPT VISIT MOD MDM: CPT

## 2020-07-25 PROCEDURE — 80053 COMPREHEN METABOLIC PANEL: CPT | Performed by: EMERGENCY MEDICINE

## 2020-07-25 PROCEDURE — 74177 CT ABD & PELVIS W/CONTRAST: CPT | Performed by: EMERGENCY MEDICINE

## 2020-07-25 PROCEDURE — 96375 TX/PRO/DX INJ NEW DRUG ADDON: CPT

## 2020-07-25 PROCEDURE — 96361 HYDRATE IV INFUSION ADD-ON: CPT

## 2020-07-25 PROCEDURE — 81025 URINE PREGNANCY TEST: CPT

## 2020-07-25 PROCEDURE — 83690 ASSAY OF LIPASE: CPT | Performed by: EMERGENCY MEDICINE

## 2020-07-25 PROCEDURE — 96374 THER/PROPH/DIAG INJ IV PUSH: CPT

## 2020-07-25 RX ORDER — HYDROMORPHONE HYDROCHLORIDE 1 MG/ML
0.5 INJECTION, SOLUTION INTRAMUSCULAR; INTRAVENOUS; SUBCUTANEOUS EVERY 30 MIN PRN
Status: DISCONTINUED | OUTPATIENT
Start: 2020-07-25 | End: 2020-07-25

## 2020-07-25 RX ORDER — ONDANSETRON 2 MG/ML
4 INJECTION INTRAMUSCULAR; INTRAVENOUS ONCE
Status: COMPLETED | OUTPATIENT
Start: 2020-07-25 | End: 2020-07-25

## 2020-07-25 NOTE — ED INITIAL ASSESSMENT (HPI)
33 yo F, hx of recently diagnosed pancreatitis reports increase in abdomen pain, nausea and vomiting x 2 days. Pain in RUQ abdomen. Able to see surgeon. Was supposed to see a GI for a scope. Has not seen GI for consult yet.

## 2020-07-25 NOTE — ED NOTES
Pt stating nauseated and requests emesis basin. Emesis basin issued stating dry mouth requesting H2O ice water with sponges for dry mouth issued.  md updated

## 2020-07-25 NOTE — ED PROVIDER NOTES
Patient Seen in: [facility] Emergency Department      History   Patient presents with:  Abdomen/Flank Pain    Stated Complaint: abd pain    HPI    78-year-old female who has a history of pancreatic divisum as well as a history of pancreatitis says o systems are as noted in HPI. Constitutional and vital signs reviewed. All other systems reviewed and negative except as noted above.     Physical Exam     ED Triage Vitals   BP 07/25/20 1517 110/77   Pulse 07/25/20 1600 72   Resp 07/25/20 1600 14   Te Ct Abdomen(contrast Only) (cpt=74160)    Result Date: 7/9/2020  DATE OF SERVICE: 07.09.2020 CT ABDOMEN(CONTRAST ONLY) (CPT=74160) CLINICAL INDICATION: Acute pancreatitis without necrosis or infection, unspecified. COMPARISON STUDY: None available.  [address] [facility] 59  N CT ABDOMEN+PELVIS (CONTRAST ONLY) (CPT=74177)  COMPARISON:  [facility] , CT, CT ABDOMEN+PELVIS(CONTRAST ONLY)(CPT=74177), 1/11/2019, 1:18 PM.  INDICATIONS:  22-year-old female presents with right upper quadrant/epigastric abdominal pain, nausea and emesis.   Re lesions are identified. There is scant free fluid in the posterior cul-de-sac, likely physiologic. BONES:  No bony lesion or fracture. LUNG BASES:  No visible pulmonary or pleural disease. OTHER:  Negative. CONCLUSION:  1.  No evidence to sugges enhances homogeneously. The pancreatic duct measures about 1 to 2 mm in caliber. It courses anterior to the distal common bile duct and inserts into the duodenum superior and slightly lateral to the common bile duct insertion.  SPLEEN: The spleen is grossly

## 2020-10-06 PROBLEM — R61 NIGHT SWEATS: Status: ACTIVE | Noted: 2020-10-06

## 2020-10-06 PROBLEM — M25.60 MORNING STIFFNESS OF JOINTS: Status: ACTIVE | Noted: 2020-10-06

## 2020-10-06 PROBLEM — K85.90 ACUTE PANCREATITIS, UNSPECIFIED COMPLICATION STATUS, UNSPECIFIED PANCREATITIS TYPE: Status: ACTIVE | Noted: 2020-10-06

## 2020-10-06 PROBLEM — R63.4 UNINTENTIONAL WEIGHT LOSS: Status: ACTIVE | Noted: 2020-10-06

## 2020-10-06 PROBLEM — K85.90 ACUTE PANCREATITIS, UNSPECIFIED COMPLICATION STATUS, UNSPECIFIED PANCREATITIS TYPE (HCC): Status: ACTIVE | Noted: 2020-10-06

## 2020-11-17 NOTE — ED NOTES
Patient is resting comfortably on stretcher lights dimmed rating c/o RUQ pain 6/10 Wound Care: Petrolatum

## 2021-01-21 PROBLEM — R61 NIGHT SWEATS: Status: RESOLVED | Noted: 2020-10-06 | Resolved: 2021-01-21

## 2021-01-21 PROBLEM — R11.2 NON-INTRACTABLE VOMITING WITH NAUSEA, UNSPECIFIED VOMITING TYPE: Status: RESOLVED | Noted: 2020-07-08 | Resolved: 2021-01-21

## 2021-01-21 PROBLEM — R10.13 DYSPEPSIA: Status: RESOLVED | Noted: 2020-07-08 | Resolved: 2021-01-21

## 2021-01-21 PROBLEM — M25.60 MORNING STIFFNESS OF JOINTS: Status: RESOLVED | Noted: 2020-10-06 | Resolved: 2021-01-21

## 2021-01-21 PROBLEM — N64.2 BREAST ATROPHY: Status: ACTIVE | Noted: 2021-01-21

## 2021-12-29 PROBLEM — K85.90 ACUTE PANCREATITIS, UNSPECIFIED COMPLICATION STATUS, UNSPECIFIED PANCREATITIS TYPE: Status: RESOLVED | Noted: 2020-10-06 | Resolved: 2021-12-29

## 2021-12-29 PROBLEM — K85.90 ACUTE PANCREATITIS, UNSPECIFIED COMPLICATION STATUS, UNSPECIFIED PANCREATITIS TYPE (HCC): Status: RESOLVED | Noted: 2020-10-06 | Resolved: 2021-12-29

## 2021-12-29 PROBLEM — R63.4 UNINTENTIONAL WEIGHT LOSS: Status: RESOLVED | Noted: 2020-10-06 | Resolved: 2021-12-29

## 2022-05-07 ENCOUNTER — APPOINTMENT (OUTPATIENT)
Dept: CT IMAGING | Facility: HOSPITAL | Age: 32
End: 2022-05-07
Attending: EMERGENCY MEDICINE
Payer: COMMERCIAL

## 2022-05-07 ENCOUNTER — HOSPITAL ENCOUNTER (EMERGENCY)
Facility: HOSPITAL | Age: 32
Discharge: HOME | End: 2022-05-07
Attending: EMERGENCY MEDICINE
Payer: COMMERCIAL

## 2022-05-07 VITALS
BODY MASS INDEX: 18.48 KG/M2 | HEIGHT: 66 IN | SYSTOLIC BLOOD PRESSURE: 112 MMHG | OXYGEN SATURATION: 98 % | TEMPERATURE: 98 F | HEART RATE: 84 BPM | RESPIRATION RATE: 16 BRPM | DIASTOLIC BLOOD PRESSURE: 76 MMHG | WEIGHT: 115 LBS

## 2022-05-07 DIAGNOSIS — R11.2 NAUSEA AND VOMITING, UNSPECIFIED VOMITING TYPE: Primary | ICD-10-CM

## 2022-05-07 DIAGNOSIS — R55 SYNCOPE, NEAR: ICD-10-CM

## 2022-05-07 DIAGNOSIS — R19.7 DIARRHEA, UNSPECIFIED TYPE: ICD-10-CM

## 2022-05-07 LAB
ALBUMIN SERPL-MCNC: 4 G/DL (ref 3.4–5)
ALBUMIN/GLOB SERPL: 1.1 {RATIO} (ref 1–2)
ALP LIVER SERPL-CCNC: 53 U/L
ALT SERPL-CCNC: 19 U/L
ANION GAP SERPL CALC-SCNC: 8 MMOL/L (ref 0–18)
AST SERPL-CCNC: 19 U/L (ref 15–37)
B-HCG SERPL-ACNC: <1 MIU/ML
BASOPHILS # BLD AUTO: 0.03 X10(3) UL (ref 0–0.2)
BASOPHILS NFR BLD AUTO: 0.5 %
BILIRUB SERPL-MCNC: 0.3 MG/DL (ref 0.1–2)
BUN BLD-MCNC: 12 MG/DL (ref 7–18)
CALCIUM BLD-MCNC: 9.2 MG/DL (ref 8.5–10.1)
CHLORIDE SERPL-SCNC: 107 MMOL/L (ref 98–112)
CO2 SERPL-SCNC: 24 MMOL/L (ref 21–32)
CREAT BLD-MCNC: 0.85 MG/DL
EOSINOPHIL # BLD AUTO: 0.08 X10(3) UL (ref 0–0.7)
EOSINOPHIL NFR BLD AUTO: 1.4 %
ERYTHROCYTE [DISTWIDTH] IN BLOOD BY AUTOMATED COUNT: 13.2 %
GLOBULIN PLAS-MCNC: 3.8 G/DL (ref 2.8–4.4)
GLUCOSE BLD-MCNC: 92 MG/DL (ref 70–99)
HCT VFR BLD AUTO: 42.6 %
HGB BLD-MCNC: 14.2 G/DL
IMM GRANULOCYTES # BLD AUTO: 0.01 X10(3) UL (ref 0–1)
IMM GRANULOCYTES NFR BLD: 0.2 %
LYMPHOCYTES # BLD AUTO: 1.67 X10(3) UL (ref 1–4)
LYMPHOCYTES NFR BLD AUTO: 28.6 %
MCH RBC QN AUTO: 31.7 PG (ref 26–34)
MCHC RBC AUTO-ENTMCNC: 33.3 G/DL (ref 31–37)
MCV RBC AUTO: 95.1 FL
MONOCYTES # BLD AUTO: 0.69 X10(3) UL (ref 0.1–1)
MONOCYTES NFR BLD AUTO: 11.8 %
NEUTROPHILS # BLD AUTO: 3.35 X10 (3) UL (ref 1.5–7.7)
NEUTROPHILS # BLD AUTO: 3.35 X10(3) UL (ref 1.5–7.7)
NEUTROPHILS NFR BLD AUTO: 57.5 %
OSMOLALITY SERPL CALC.SUM OF ELEC: 287 MOSM/KG (ref 275–295)
PLATELET # BLD AUTO: 342 10(3)UL (ref 150–450)
POTASSIUM SERPL-SCNC: 4.3 MMOL/L (ref 3.5–5.1)
PROT SERPL-MCNC: 7.8 G/DL (ref 6.4–8.2)
RBC # BLD AUTO: 4.48 X10(6)UL
SARS-COV-2 RNA RESP QL NAA+PROBE: NOT DETECTED
SODIUM SERPL-SCNC: 139 MMOL/L (ref 136–145)
TROPONIN I HIGH SENSITIVITY: 4 NG/L
WBC # BLD AUTO: 5.8 X10(3) UL (ref 4–11)

## 2022-05-07 PROCEDURE — 84484 ASSAY OF TROPONIN QUANT: CPT | Performed by: EMERGENCY MEDICINE

## 2022-05-07 PROCEDURE — 96374 THER/PROPH/DIAG INJ IV PUSH: CPT

## 2022-05-07 PROCEDURE — 74177 CT ABD & PELVIS W/CONTRAST: CPT | Performed by: EMERGENCY MEDICINE

## 2022-05-07 PROCEDURE — 70450 CT HEAD/BRAIN W/O DYE: CPT | Performed by: EMERGENCY MEDICINE

## 2022-05-07 PROCEDURE — 96375 TX/PRO/DX INJ NEW DRUG ADDON: CPT

## 2022-05-07 PROCEDURE — 93010 ELECTROCARDIOGRAM REPORT: CPT

## 2022-05-07 PROCEDURE — 85025 COMPLETE CBC W/AUTO DIFF WBC: CPT | Performed by: EMERGENCY MEDICINE

## 2022-05-07 PROCEDURE — 99284 EMERGENCY DEPT VISIT MOD MDM: CPT

## 2022-05-07 PROCEDURE — 96372 THER/PROPH/DIAG INJ SC/IM: CPT

## 2022-05-07 PROCEDURE — 80053 COMPREHEN METABOLIC PANEL: CPT | Performed by: EMERGENCY MEDICINE

## 2022-05-07 PROCEDURE — 84702 CHORIONIC GONADOTROPIN TEST: CPT | Performed by: EMERGENCY MEDICINE

## 2022-05-07 PROCEDURE — 93005 ELECTROCARDIOGRAM TRACING: CPT

## 2022-05-07 PROCEDURE — 96361 HYDRATE IV INFUSION ADD-ON: CPT

## 2022-05-07 RX ORDER — KETOROLAC TROMETHAMINE 30 MG/ML
30 INJECTION, SOLUTION INTRAMUSCULAR; INTRAVENOUS ONCE
Status: COMPLETED | OUTPATIENT
Start: 2022-05-07 | End: 2022-05-07

## 2022-05-07 RX ORDER — ONDANSETRON 2 MG/ML
4 INJECTION INTRAMUSCULAR; INTRAVENOUS ONCE
Status: COMPLETED | OUTPATIENT
Start: 2022-05-07 | End: 2022-05-07

## 2022-05-07 RX ORDER — IOHEXOL 350 MG/ML
75 INJECTION, SOLUTION INTRAVENOUS
Status: COMPLETED | OUTPATIENT
Start: 2022-05-07 | End: 2022-05-07

## 2022-05-07 RX ORDER — SODIUM CHLORIDE 9 MG/ML
1000 INJECTION, SOLUTION INTRAVENOUS ONCE
Status: COMPLETED | OUTPATIENT
Start: 2022-05-07 | End: 2022-05-07

## 2022-05-07 RX ORDER — DICYCLOMINE HCL 20 MG
20 TABLET ORAL 4 TIMES DAILY PRN
Qty: 15 TABLET | Refills: 0 | Status: SHIPPED | OUTPATIENT
Start: 2022-05-07

## 2022-05-07 RX ORDER — ONDANSETRON 4 MG/1
4 TABLET, ORALLY DISINTEGRATING ORAL EVERY 4 HOURS PRN
Qty: 10 TABLET | Refills: 0 | Status: SHIPPED | OUTPATIENT
Start: 2022-05-07 | End: 2022-05-14

## 2022-05-07 RX ORDER — DICYCLOMINE HYDROCHLORIDE 10 MG/ML
20 INJECTION INTRAMUSCULAR ONCE
Status: COMPLETED | OUTPATIENT
Start: 2022-05-07 | End: 2022-05-07

## 2022-05-07 NOTE — ED QUICK NOTES
Pt was road tested by PCT and EMT student. Pt sts that she feels lightheaded and vision feels hazy. Pt has stable gait. Pt ambulated back to bed. MD notified and aware. No further orders @ this time.

## 2022-05-07 NOTE — ED QUICK NOTES
Per MD, PO challenge pt. Pt given cup of water. Pt finished whole cup. Pt denies any nausea after oral liquids. MD notified.

## 2022-05-07 NOTE — ED QUICK NOTES
Pt states she has vomited 20 times since last night,  Had syncopal episode this am.  Also has trouble focusing

## 2022-05-07 NOTE — ED INITIAL ASSESSMENT (HPI)
Pt c/o vomiting and diarrhea since yesterday. Pt states feeling increasingly weak and dizzy. Pt c/o lower abdominal pain. Pt refusing  to use wheelchair, pt ambulatory to triage.

## 2022-05-08 LAB
ATRIAL RATE: 77 BPM
P AXIS: 72 DEGREES
P-R INTERVAL: 120 MS
Q-T INTERVAL: 380 MS
QRS DURATION: 76 MS
QTC CALCULATION (BEZET): 430 MS
R AXIS: 85 DEGREES
T AXIS: 49 DEGREES
VENTRICULAR RATE: 77 BPM

## 2023-08-14 LAB
AMB EXT TREPONEMAL ANTIBODIES: NEGATIVE
HEPATITIS B SURFACE ANTIGEN OB: NEGATIVE
HIV ANTIGEN-ANTIBODY QUAL: NONREACTIVE

## 2023-11-27 ENCOUNTER — HOSPITAL ENCOUNTER (EMERGENCY)
Facility: HOSPITAL | Age: 33
Discharge: HOME | End: 2023-11-27
Attending: EMERGENCY MEDICINE
Payer: COMMERCIAL

## 2023-11-27 ENCOUNTER — APPOINTMENT (OUTPATIENT)
Dept: GENERAL RADIOLOGY | Facility: HOSPITAL | Age: 33
End: 2023-11-27
Attending: EMERGENCY MEDICINE
Payer: COMMERCIAL

## 2023-11-27 VITALS
HEART RATE: 79 BPM | TEMPERATURE: 97 F | SYSTOLIC BLOOD PRESSURE: 113 MMHG | DIASTOLIC BLOOD PRESSURE: 76 MMHG | BODY MASS INDEX: 19.29 KG/M2 | HEIGHT: 66 IN | RESPIRATION RATE: 18 BRPM | OXYGEN SATURATION: 100 % | WEIGHT: 120 LBS

## 2023-11-27 DIAGNOSIS — R55 SYNCOPE AND COLLAPSE: Primary | ICD-10-CM

## 2023-11-27 DIAGNOSIS — Z3A.24 24 WEEKS GESTATION OF PREGNANCY: ICD-10-CM

## 2023-11-27 LAB
ALBUMIN SERPL-MCNC: 3.3 G/DL (ref 3.4–5)
ALBUMIN/GLOB SERPL: 0.9 {RATIO} (ref 1–2)
ALP LIVER SERPL-CCNC: 64 U/L
ALT SERPL-CCNC: 20 U/L
ANION GAP SERPL CALC-SCNC: 5 MMOL/L (ref 0–18)
AST SERPL-CCNC: 11 U/L (ref 15–37)
ATRIAL RATE: 79 BPM
BASOPHILS # BLD AUTO: 0.03 X10(3) UL (ref 0–0.2)
BASOPHILS NFR BLD AUTO: 0.3 %
BILIRUB SERPL-MCNC: 0.2 MG/DL (ref 0.1–2)
BUN BLD-MCNC: 11 MG/DL (ref 9–23)
CALCIUM BLD-MCNC: 8.9 MG/DL (ref 8.5–10.1)
CHLORIDE SERPL-SCNC: 108 MMOL/L (ref 98–112)
CO2 SERPL-SCNC: 21 MMOL/L (ref 21–32)
CREAT BLD-MCNC: 0.66 MG/DL
D DIMER PPP FEU-MCNC: <0.27 UG/ML FEU (ref ?–0.5)
EGFRCR SERPLBLD CKD-EPI 2021: 119 ML/MIN/1.73M2 (ref 60–?)
EOSINOPHIL # BLD AUTO: 0.06 X10(3) UL (ref 0–0.7)
EOSINOPHIL NFR BLD AUTO: 0.7 %
ERYTHROCYTE [DISTWIDTH] IN BLOOD BY AUTOMATED COUNT: 12.7 %
GLOBULIN PLAS-MCNC: 3.7 G/DL (ref 2.8–4.4)
GLUCOSE BLD-MCNC: 111 MG/DL (ref 70–99)
HCT VFR BLD AUTO: 36.1 %
HGB BLD-MCNC: 12.1 G/DL
IMM GRANULOCYTES # BLD AUTO: 0.03 X10(3) UL (ref 0–1)
IMM GRANULOCYTES NFR BLD: 0.3 %
LYMPHOCYTES # BLD AUTO: 0.88 X10(3) UL (ref 1–4)
LYMPHOCYTES NFR BLD AUTO: 9.6 %
MCH RBC QN AUTO: 32.4 PG (ref 26–34)
MCHC RBC AUTO-ENTMCNC: 33.5 G/DL (ref 31–37)
MCV RBC AUTO: 96.8 FL
MONOCYTES # BLD AUTO: 0.57 X10(3) UL (ref 0.1–1)
MONOCYTES NFR BLD AUTO: 6.2 %
NEUTROPHILS # BLD AUTO: 7.57 X10 (3) UL (ref 1.5–7.7)
NEUTROPHILS # BLD AUTO: 7.57 X10(3) UL (ref 1.5–7.7)
NEUTROPHILS NFR BLD AUTO: 82.9 %
OSMOLALITY SERPL CALC.SUM OF ELEC: 278 MOSM/KG (ref 275–295)
P AXIS: 65 DEGREES
P-R INTERVAL: 118 MS
PLATELET # BLD AUTO: 275 10(3)UL (ref 150–450)
POTASSIUM SERPL-SCNC: 4.1 MMOL/L (ref 3.5–5.1)
PROT SERPL-MCNC: 7 G/DL (ref 6.4–8.2)
Q-T INTERVAL: 364 MS
QRS DURATION: 70 MS
QTC CALCULATION (BEZET): 417 MS
R AXIS: 84 DEGREES
RBC # BLD AUTO: 3.73 X10(6)UL
SODIUM SERPL-SCNC: 134 MMOL/L (ref 136–145)
T AXIS: 64 DEGREES
TROPONIN I SERPL HS-MCNC: 4 NG/L
VENTRICULAR RATE: 79 BPM
WBC # BLD AUTO: 9.1 X10(3) UL (ref 4–11)

## 2023-11-27 PROCEDURE — 93010 ELECTROCARDIOGRAM REPORT: CPT | Performed by: INTERNAL MEDICINE

## 2023-11-27 PROCEDURE — 99284 EMERGENCY DEPT VISIT MOD MDM: CPT

## 2023-11-27 PROCEDURE — 99285 EMERGENCY DEPT VISIT HI MDM: CPT

## 2023-11-27 PROCEDURE — 93005 ELECTROCARDIOGRAM TRACING: CPT

## 2023-11-27 PROCEDURE — 96360 HYDRATION IV INFUSION INIT: CPT

## 2023-11-27 PROCEDURE — 93010 ELECTROCARDIOGRAM REPORT: CPT

## 2023-11-27 PROCEDURE — 84484 ASSAY OF TROPONIN QUANT: CPT | Performed by: EMERGENCY MEDICINE

## 2023-11-27 PROCEDURE — 71045 X-RAY EXAM CHEST 1 VIEW: CPT | Performed by: EMERGENCY MEDICINE

## 2023-11-27 PROCEDURE — 80053 COMPREHEN METABOLIC PANEL: CPT | Performed by: EMERGENCY MEDICINE

## 2023-11-27 PROCEDURE — 85025 COMPLETE CBC W/AUTO DIFF WBC: CPT | Performed by: EMERGENCY MEDICINE

## 2023-11-27 PROCEDURE — 96361 HYDRATE IV INFUSION ADD-ON: CPT

## 2023-11-27 PROCEDURE — 85379 FIBRIN DEGRADATION QUANT: CPT | Performed by: EMERGENCY MEDICINE

## 2023-11-27 NOTE — DISCHARGE INSTRUCTIONS
Follow-up with your primary care physician, OB return if any significant Kasi pain, chest pain, shortness of breath,             you were seen in the emergency room in a limited time. There is a possibility that although we do not see any acute process at this present time that things can change with time. Is therefore imperative that you follow-up with primary care physician for close follow-up. If there is any significant progression of your pain  or other symptoms you to return immediately to the emergency room.

## 2023-11-27 NOTE — NST
Nonstress Test   Patient: Tio Rowland    Gestation: Unknown    NST:       Variability: Moderate           Accelerations: Yes           Decelerations: None            Baseline: 125 BPM                                                                            Nonstress Test Interpretation: Appropriate for gestational age                                 Additional Comments

## 2023-12-16 LAB — AMB EXT 3 HR GLUCOSE GESTATIONAL: 94

## 2024-01-22 ENCOUNTER — LAB ENCOUNTER (OUTPATIENT)
Dept: LAB | Facility: HOSPITAL | Age: 34
End: 2024-01-22
Attending: STUDENT IN AN ORGANIZED HEALTH CARE EDUCATION/TRAINING PROGRAM
Payer: COMMERCIAL

## 2024-01-22 ENCOUNTER — HOSPITAL ENCOUNTER (OUTPATIENT)
Facility: HOSPITAL | Age: 34
Discharge: HOME | End: 2024-01-22
Attending: STUDENT IN AN ORGANIZED HEALTH CARE EDUCATION/TRAINING PROGRAM | Admitting: STUDENT IN AN ORGANIZED HEALTH CARE EDUCATION/TRAINING PROGRAM
Payer: COMMERCIAL

## 2024-01-22 VITALS
DIASTOLIC BLOOD PRESSURE: 76 MMHG | BODY MASS INDEX: 21 KG/M2 | TEMPERATURE: 99 F | HEART RATE: 104 BPM | WEIGHT: 128 LBS | SYSTOLIC BLOOD PRESSURE: 120 MMHG

## 2024-01-22 LAB
ALBUMIN SERPL-MCNC: 3 G/DL (ref 3.4–5)
ALBUMIN/GLOB SERPL: 0.8 {RATIO} (ref 1–2)
ALP LIVER SERPL-CCNC: 94 U/L
AMYLASE SERPL-CCNC: 105 U/L (ref 25–115)
ANION GAP SERPL CALC-SCNC: 8 MMOL/L (ref 0–18)
AST SERPL-CCNC: 5 U/L (ref 15–37)
BASOPHILS # BLD AUTO: 0.04 X10(3) UL (ref 0–0.2)
BASOPHILS NFR BLD AUTO: 0.3 %
BILIRUB SERPL-MCNC: 0.2 MG/DL (ref 0.1–2)
BUN BLD-MCNC: 7 MG/DL (ref 9–23)
CALCIUM BLD-MCNC: 8.9 MG/DL (ref 8.5–10.1)
CHLORIDE SERPL-SCNC: 106 MMOL/L (ref 98–112)
CO2 SERPL-SCNC: 25 MMOL/L (ref 21–32)
CREAT BLD-MCNC: 0.46 MG/DL
CREAT UR-SCNC: 17.9 MG/DL
EGFRCR SERPLBLD CKD-EPI 2021: 130 ML/MIN/1.73M2 (ref 60–?)
EOSINOPHIL # BLD AUTO: 0.15 X10(3) UL (ref 0–0.7)
EOSINOPHIL NFR BLD AUTO: 1.1 %
ERYTHROCYTE [DISTWIDTH] IN BLOOD BY AUTOMATED COUNT: 13 %
FASTING STATUS PATIENT QL REPORTED: NO
GLOBULIN PLAS-MCNC: 3.6 G/DL (ref 2.8–4.4)
GLUCOSE BLD-MCNC: 99 MG/DL (ref 70–99)
HCT VFR BLD AUTO: 33.6 %
HGB BLD-MCNC: 11.4 G/DL
IMM GRANULOCYTES # BLD AUTO: 0.06 X10(3) UL (ref 0–1)
IMM GRANULOCYTES NFR BLD: 0.5 %
LIPASE SERPL-CCNC: 94 U/L (ref ?–300)
LYMPHOCYTES # BLD AUTO: 2.9 X10(3) UL (ref 1–4)
LYMPHOCYTES NFR BLD AUTO: 21.9 %
MCH RBC QN AUTO: 31.8 PG (ref 26–34)
MCHC RBC AUTO-ENTMCNC: 33.9 G/DL (ref 31–37)
MCV RBC AUTO: 93.9 FL
MONOCYTES # BLD AUTO: 0.78 X10(3) UL (ref 0.1–1)
MONOCYTES NFR BLD AUTO: 5.9 %
NEUTROPHILS # BLD AUTO: 9.32 X10 (3) UL (ref 1.5–7.7)
NEUTROPHILS # BLD AUTO: 9.32 X10(3) UL (ref 1.5–7.7)
NEUTROPHILS NFR BLD AUTO: 70.3 %
OSMOLALITY SERPL CALC.SUM OF ELEC: 286 MOSM/KG (ref 275–295)
PLATELET # BLD AUTO: 323 10(3)UL (ref 150–450)
POTASSIUM SERPL-SCNC: 3.4 MMOL/L (ref 3.5–5.1)
PROT SERPL-MCNC: 6.6 G/DL (ref 6.4–8.2)
PROT UR-MCNC: 7.4 MG/DL
PROT/CREAT UR-RTO: 0.41
RBC # BLD AUTO: 3.58 X10(6)UL
SODIUM SERPL-SCNC: 139 MMOL/L (ref 136–145)
URATE SERPL-MCNC: 2.7 MG/DL
WBC # BLD AUTO: 13.3 X10(3) UL (ref 4–11)

## 2024-01-22 PROCEDURE — 99213 OFFICE O/P EST LOW 20 MIN: CPT

## 2024-01-22 PROCEDURE — S0028 INJECTION, FAMOTIDINE, 20 MG: HCPCS | Performed by: STUDENT IN AN ORGANIZED HEALTH CARE EDUCATION/TRAINING PROGRAM

## 2024-01-22 PROCEDURE — 84156 ASSAY OF PROTEIN URINE: CPT | Performed by: STUDENT IN AN ORGANIZED HEALTH CARE EDUCATION/TRAINING PROGRAM

## 2024-01-22 PROCEDURE — 59025 FETAL NON-STRESS TEST: CPT

## 2024-01-22 PROCEDURE — 82150 ASSAY OF AMYLASE: CPT | Performed by: STUDENT IN AN ORGANIZED HEALTH CARE EDUCATION/TRAINING PROGRAM

## 2024-01-22 PROCEDURE — 96375 TX/PRO/DX INJ NEW DRUG ADDON: CPT

## 2024-01-22 PROCEDURE — 36415 COLL VENOUS BLD VENIPUNCTURE: CPT

## 2024-01-22 PROCEDURE — 82570 ASSAY OF URINE CREATININE: CPT | Performed by: STUDENT IN AN ORGANIZED HEALTH CARE EDUCATION/TRAINING PROGRAM

## 2024-01-22 PROCEDURE — 80053 COMPREHEN METABOLIC PANEL: CPT | Performed by: STUDENT IN AN ORGANIZED HEALTH CARE EDUCATION/TRAINING PROGRAM

## 2024-01-22 PROCEDURE — 84550 ASSAY OF BLOOD/URIC ACID: CPT | Performed by: STUDENT IN AN ORGANIZED HEALTH CARE EDUCATION/TRAINING PROGRAM

## 2024-01-22 PROCEDURE — 96374 THER/PROPH/DIAG INJ IV PUSH: CPT

## 2024-01-22 PROCEDURE — 85025 COMPLETE CBC W/AUTO DIFF WBC: CPT | Performed by: STUDENT IN AN ORGANIZED HEALTH CARE EDUCATION/TRAINING PROGRAM

## 2024-01-22 PROCEDURE — 83690 ASSAY OF LIPASE: CPT | Performed by: STUDENT IN AN ORGANIZED HEALTH CARE EDUCATION/TRAINING PROGRAM

## 2024-01-22 RX ORDER — FAMOTIDINE 10 MG/ML
20 INJECTION, SOLUTION INTRAVENOUS 2 TIMES DAILY
Status: DISCONTINUED | OUTPATIENT
Start: 2024-01-22 | End: 2024-01-22

## 2024-01-22 RX ORDER — CHOLECALCIFEROL (VITAMIN D3) 25 MCG
1 TABLET,CHEWABLE ORAL DAILY
COMMUNITY

## 2024-01-22 RX ORDER — ONDANSETRON 2 MG/ML
4 INJECTION INTRAMUSCULAR; INTRAVENOUS EVERY 6 HOURS PRN
Status: DISCONTINUED | OUTPATIENT
Start: 2024-01-22 | End: 2024-01-22

## 2024-01-22 RX ORDER — PROGESTERONE 200 MG/1
200 CAPSULE ORAL NIGHTLY
COMMUNITY

## 2024-01-22 RX ORDER — ACETAMINOPHEN 500 MG
1000 TABLET ORAL EVERY 6 HOURS PRN
Status: DISCONTINUED | OUTPATIENT
Start: 2024-01-22 | End: 2024-01-22

## 2024-01-22 NOTE — NST
Nonstress Test   Patient: Martina Centeno    Gestation: 31w6d    NST:       Variability: Moderate           Accelerations: Yes           Decelerations: None            Baseline: 130 BPM           Uterine Irritability: Yes           Contractions: Irregular                                        Acoustic Stimulator: No           Nonstress Test Interpretation: Appropriate for gestational age           Nonstress Test Second Interpretation: Appropriate for gestational age                     Additional Comments

## 2024-01-22 NOTE — DISCHARGE INSTRUCTIONS
Discharge Instructions    Diet: Regular Diet: push fluids  Activity: Normal activity         General Instructions    Call your OB doctor if: Fluid leaking from your vagina;Decrease in fetal movement;Vaginal bleeding;Uterine contractions increasing in intensity and frequency;Temperature greater than 100F;Uterine contractions 10 minutes or closer for 1 to 2 hours

## 2024-01-22 NOTE — PROGRESS NOTES
Dr. Gilliam at bedside to discuss POC moving forward with patient. Patient will start 24 hour urine and return it tomorrow and will keep next follow up appt in the office. Patient and  both agreeable to POC

## 2024-01-22 NOTE — PROGRESS NOTES
Pt is a 33 year old female admitted to TRG3/TRG3-A.     Chief Complaint   Patient presents with    Medical Complication     Patient states she started experiencing sharp right abdominal pain starting at noon today.       Pt is  31w6d intra-uterine pregnancy.  History obtained, consents signed. Oriented to room, staff, and plan of care.

## 2024-01-22 NOTE — PROGRESS NOTES
Mane Clement called and made aware of patient arrival and chief complaint. Orders given for preeclampsia labs as well as a GI cocktail. Will return phone call with results

## 2024-01-23 LAB
CREAT UR-SCNC: 1.09 G/24 HR (ref 0.6–1.8)
M PROTEIN 24H UR ELPH-MRATE: 342 MG/24 HR (ref ?–149.1)
SPECIMEN VOL UR: 2000 ML
SPECIMEN VOL UR: 2000 ML

## 2024-01-24 ENCOUNTER — APPOINTMENT (OUTPATIENT)
Dept: ULTRASOUND IMAGING | Facility: HOSPITAL | Age: 34
End: 2024-01-24
Attending: OBSTETRICS & GYNECOLOGY
Payer: COMMERCIAL

## 2024-01-24 ENCOUNTER — HOSPITAL ENCOUNTER (INPATIENT)
Facility: HOSPITAL | Age: 34
LOS: 2 days | Discharge: HOME | End: 2024-01-27
Attending: OBSTETRICS & GYNECOLOGY | Admitting: OBSTETRICS & GYNECOLOGY
Payer: COMMERCIAL

## 2024-01-24 DIAGNOSIS — G43.909 MIGRAINE WITHOUT STATUS MIGRAINOSUS, NOT INTRACTABLE, UNSPECIFIED MIGRAINE TYPE: Primary | ICD-10-CM

## 2024-01-24 PROBLEM — O26.899 EPIGASTRIC PAIN DURING PREGNANCY, ANTEPARTUM (HCC): Status: ACTIVE | Noted: 2024-01-24

## 2024-01-24 PROBLEM — R10.13 EPIGASTRIC PAIN DURING PREGNANCY, ANTEPARTUM (HCC): Status: ACTIVE | Noted: 2024-01-24

## 2024-01-24 PROBLEM — R10.13 EPIGASTRIC PAIN DURING PREGNANCY, ANTEPARTUM: Status: ACTIVE | Noted: 2024-01-24

## 2024-01-24 PROBLEM — O26.899 EPIGASTRIC PAIN DURING PREGNANCY, ANTEPARTUM: Status: ACTIVE | Noted: 2024-01-24

## 2024-01-24 LAB
ALBUMIN SERPL-MCNC: 3 G/DL (ref 3.4–5)
ALBUMIN/GLOB SERPL: 0.8 {RATIO} (ref 1–2)
ALP LIVER SERPL-CCNC: 93 U/L
ANION GAP SERPL CALC-SCNC: 4 MMOL/L (ref 0–18)
AST SERPL-CCNC: 4 U/L (ref 15–37)
BASOPHILS # BLD AUTO: 0.02 X10(3) UL (ref 0–0.2)
BASOPHILS NFR BLD AUTO: 0.2 %
BILIRUB SERPL-MCNC: 0.2 MG/DL (ref 0.1–2)
BILIRUB UR QL STRIP.AUTO: NEGATIVE
BUN BLD-MCNC: 7 MG/DL (ref 9–23)
CALCIUM BLD-MCNC: 9 MG/DL (ref 8.5–10.1)
CHLORIDE SERPL-SCNC: 108 MMOL/L (ref 98–112)
CLARITY UR REFRACT.AUTO: CLEAR
CO2 SERPL-SCNC: 26 MMOL/L (ref 21–32)
CREAT BLD-MCNC: 0.52 MG/DL
EGFRCR SERPLBLD CKD-EPI 2021: 126 ML/MIN/1.73M2 (ref 60–?)
EOSINOPHIL # BLD AUTO: 0.12 X10(3) UL (ref 0–0.7)
EOSINOPHIL NFR BLD AUTO: 1.1 %
ERYTHROCYTE [DISTWIDTH] IN BLOOD BY AUTOMATED COUNT: 13.2 %
GLOBULIN PLAS-MCNC: 3.6 G/DL (ref 2.8–4.4)
GLUCOSE BLD-MCNC: 135 MG/DL (ref 70–99)
GLUCOSE BLD-MCNC: 154 MG/DL (ref 70–99)
GLUCOSE BLD-MCNC: 97 MG/DL (ref 70–99)
GLUCOSE UR STRIP.AUTO-MCNC: NORMAL MG/DL
HCT VFR BLD AUTO: 33.5 %
HGB BLD-MCNC: 11.4 G/DL
IMM GRANULOCYTES # BLD AUTO: 0.04 X10(3) UL (ref 0–1)
IMM GRANULOCYTES NFR BLD: 0.4 %
KETONES UR STRIP.AUTO-MCNC: NEGATIVE MG/DL
LEUKOCYTE ESTERASE UR QL STRIP.AUTO: NEGATIVE
LYMPHOCYTES # BLD AUTO: 1.67 X10(3) UL (ref 1–4)
LYMPHOCYTES NFR BLD AUTO: 15.2 %
MCH RBC QN AUTO: 31.8 PG (ref 26–34)
MCHC RBC AUTO-ENTMCNC: 34 G/DL (ref 31–37)
MCV RBC AUTO: 93.6 FL
MONOCYTES # BLD AUTO: 0.57 X10(3) UL (ref 0.1–1)
MONOCYTES NFR BLD AUTO: 5.2 %
NEUTROPHILS # BLD AUTO: 8.59 X10 (3) UL (ref 1.5–7.7)
NEUTROPHILS # BLD AUTO: 8.59 X10(3) UL (ref 1.5–7.7)
NEUTROPHILS NFR BLD AUTO: 77.9 %
NITRITE UR QL STRIP.AUTO: NEGATIVE
OSMOLALITY SERPL CALC.SUM OF ELEC: 284 MOSM/KG (ref 275–295)
PH UR STRIP.AUTO: 7 [PH] (ref 5–8)
PLATELET # BLD AUTO: 296 10(3)UL (ref 150–450)
POTASSIUM SERPL-SCNC: 3.7 MMOL/L (ref 3.5–5.1)
PROT SERPL-MCNC: 6.6 G/DL (ref 6.4–8.2)
PROT UR STRIP.AUTO-MCNC: NEGATIVE MG/DL
RBC # BLD AUTO: 3.58 X10(6)UL
RBC UR QL AUTO: NEGATIVE
SODIUM SERPL-SCNC: 138 MMOL/L (ref 136–145)
SP GR UR STRIP.AUTO: 1.01 (ref 1–1.03)
URATE SERPL-MCNC: 3 MG/DL
UROBILINOGEN UR STRIP.AUTO-MCNC: NORMAL MG/DL
WBC # BLD AUTO: 11 X10(3) UL (ref 4–11)

## 2024-01-24 PROCEDURE — 76700 US EXAM ABDOM COMPLETE: CPT | Performed by: OBSTETRICS & GYNECOLOGY

## 2024-01-24 RX ORDER — BUTALBITAL, ACETAMINOPHEN AND CAFFEINE 50; 325; 40 MG/1; MG/1; MG/1
1 TABLET ORAL EVERY 6 HOURS PRN
Status: DISCONTINUED | OUTPATIENT
Start: 2024-01-24 | End: 2024-01-25

## 2024-01-24 RX ORDER — ACETAMINOPHEN 500 MG
1000 TABLET ORAL EVERY 6 HOURS PRN
Status: DISCONTINUED | OUTPATIENT
Start: 2024-01-24 | End: 2024-01-24

## 2024-01-24 RX ORDER — BETAMETHASONE SODIUM PHOSPHATE AND BETAMETHASONE ACETATE 3; 3 MG/ML; MG/ML
12 INJECTION, SUSPENSION INTRA-ARTICULAR; INTRALESIONAL; INTRAMUSCULAR; SOFT TISSUE EVERY 24 HOURS
Status: COMPLETED | OUTPATIENT
Start: 2024-01-24 | End: 2024-01-25

## 2024-01-24 RX ORDER — CALCIUM CARBONATE 500 MG/1
1000 TABLET, CHEWABLE ORAL
Status: DISCONTINUED | OUTPATIENT
Start: 2024-01-24 | End: 2024-01-26

## 2024-01-24 RX ORDER — ACETAMINOPHEN 325 MG/1
650 TABLET ORAL EVERY 6 HOURS PRN
Status: DISCONTINUED | OUTPATIENT
Start: 2024-01-24 | End: 2024-01-27

## 2024-01-24 RX ORDER — FAMOTIDINE 20 MG/1
20 TABLET, FILM COATED ORAL 2 TIMES DAILY
Status: DISCONTINUED | OUTPATIENT
Start: 2024-01-24 | End: 2024-01-27

## 2024-01-24 RX ORDER — ACETAMINOPHEN 500 MG
500 TABLET ORAL EVERY 6 HOURS PRN
Status: DISCONTINUED | OUTPATIENT
Start: 2024-01-24 | End: 2024-01-24

## 2024-01-24 RX ORDER — PROGESTERONE 200 MG/1
200 CAPSULE ORAL NIGHTLY
Status: DISCONTINUED | OUTPATIENT
Start: 2024-01-24 | End: 2024-01-24

## 2024-01-24 RX ORDER — CHOLECALCIFEROL (VITAMIN D3) 25 MCG
1 TABLET,CHEWABLE ORAL DAILY
Status: DISCONTINUED | OUTPATIENT
Start: 2024-01-24 | End: 2024-01-26

## 2024-01-24 RX ORDER — PROGESTERONE 200 MG/1
200 CAPSULE ORAL NIGHTLY
Status: DISCONTINUED | OUTPATIENT
Start: 2024-01-25 | End: 2024-01-27

## 2024-01-24 RX ORDER — DOCUSATE SODIUM 100 MG/1
100 CAPSULE, LIQUID FILLED ORAL 2 TIMES DAILY
Status: DISCONTINUED | OUTPATIENT
Start: 2024-01-24 | End: 2024-01-27

## 2024-01-24 NOTE — PROGRESS NOTES
Pt is a 33 year old female admitted to TRG2/TRG2-A.  Pt is  32w1d intra-uterine pregnancy.    Chief Complaint   Patient presents with     Assessment      Pt presents to L&D triage w/ c/o severe RUQ pain that began on 24 and a headache that began this morning, unrelieved by Tylenol. Pt states she was notified this morning of elevated 24 hour urine and preeclampsia diagnosis and was concerned she was now symptomatic. Pt denies any visual disturbances. Patellar DTRs 2+, no clonus, no edema. EFM explained and applied, abdomen palpating soft, non-tender. Pt denies any uterine activity, leaking of fluid, or vaginal bleeding. Pt denies any UTI sxs and has had a cholecystectomy.     History obtained, oriented to room, staff, and plan of care. Pt's  @ BS.

## 2024-01-24 NOTE — PLAN OF CARE
Problem: Patient/Family Goals  Goal: Patient/Family Long Term Goal  Description: Patient's Long Term Goal: maintain pregnancy to fullterm gestation    Interventions:    - See additional Care Plan goals for specific interventions  Outcome: Progressing  Goal: Patient/Family Short Term Goal  Description: Patient's Short Term Goal: effective pain and anxiety management    Interventions:   -   - See additional Care Plan goals for specific interventions  Outcome: Progressing     Problem: ANTEPARTUM/LABOR and DELIVERY  Goal: Maintain pregnancy as long as maternal and/or fetal condition is stable  Description: INTERVENTIONS:  - Maternal surveillance  - Fetal surveillance  - Monitor uterine activity  - Medications as ordered  - Bedrest  Outcome: Progressing  Goal: Anxiety is at manageable level  Description: INTERVENTIONS:  - [place] patient to unit/surroundings  - Establish a trusting relationship with patient  - Discuss possible complications or alterations in birth plan  - Explain treatment plan  - Explain testing/procedures prior to initiation  - Encourage participation in care  - Encourage verbalization of concerns/fears  - Identify coping mechanisms  - Administer/offer alternative therapies (Aroma therapy, distraction, guided imagery, massage, music therapy, therapeutic touch)  - Manage patient's environment (Avoid overstimulation of patient)  Outcome: Progressing  Goal: Demonstrates ability to cope with hospitalization/illness  Description: INTERVENTIONS:  - Encourage verbalization of feelings/concerns/expectations  - Provide quiet environment  - Assist patient to identify own strengths and abilities  - Encourage patient to set small goals for self  - Encourage participation in diversional activity  - Reinforce positive adaptation of new coping behaviors  - Include patient/family/caregiver in decisions  Outcome: Progressing

## 2024-01-24 NOTE — H&P
HPI:   Martina Centeno is a 33 year old  who presents with complaint of headache and epigastric pain. Seen in triage  for epigastric pain. Sharp right abdominal pain. Got labs which were all normal except elevated PCR at 0.3. She got IV pepcid and zofran.   Pressures on  were 120/76, 135/91, 138/97, 130/90 (all of the elevated diastolics were 20 min apart). Pressure in office  was 118/78.  She was sent home with 24 hour urine protein which resulted 342.  Headache started today and feels different than usual migraines--more on the top of her head and in her eyes. No other changes in eating, hydration, stress. She has vomited a couple times today.    Prenatal issues:  1) Migraines, sees neurology  2) H/o 35 week PTL/PTD, on vaginal progesterone  3) S<D EFW 48%ile on   4) GDM A1  5) Anxiety and depression on sertraline    OB History    Para Term  AB Living   3 2 1 1 0 2   SAB IAB Ectopic Multiple Live Births   0 0 0 0 2          No current outpatient medications on file.      Past Medical History:   Diagnosis Date    Abdominal pain     u/s confirmed everything was okay    Acute cholecystitis 1/10/2019    Anemia     w pregnancy    Anxiety 2018    postpartum anxiety-treated xanax & zoloft (stopped taking when this preg started)    Cervical dysplasia     had bx    Mental disorder     anxiety    Migraines     Nausea & vomiting     Pancreas divisum      labor     Hx PTL and PTD @ 35 wks w pregnancy       Past Surgical History:   Procedure Laterality Date    CHOLECYSTECTOMY  2019    OTHER SURGICAL HISTORY  2019    oral surgery     UPPER GI ENDOSCOPY,BIOPSY  19= Bx: Normal      Family History   Problem Relation Age of Onset    Cancer Maternal Grandmother         Multiple, unkown    No Known Problems Father     Other (breast lump benign) Mother     Heart Disease Neg     Stroke Neg       Social History:   Social History     Socioeconomic History     Marital status:     Number of children: 0   Occupational History    Occupation:    Tobacco Use    Smoking status: Never    Smokeless tobacco: Never   Vaping Use    Vaping Use: Never used   Substance and Sexual Activity    Alcohol use: Not Currently     Alcohol/week: 0.0 standard drinks of alcohol     Comment: occasional    Drug use: Not Currently     Types: Cannabis     Comment: Past use     Sexual activity: Yes     Partners: Male     Social Determinants of Health     Financial Resource Strain: Low Risk  (1/24/2024)    Financial Resource Strain     Difficulty of Paying Living Expenses: Not hard at all     Med Affordability: No   Food Insecurity: No Food Insecurity (1/24/2024)    Food Insecurity     Food Insecurity: Never true   Transportation Needs: No Transportation Needs (1/24/2024)    Transportation Needs     Lack of Transportation: No   Stress: No Stress Concern Present (1/24/2024)    Stress     Feeling of Stress : No   Housing Stability: Low Risk  (1/24/2024)    Housing Stability     Housing Instability: No        REVIEW OF SYSTEMS:   CONSTITUTIONAL: generally feels well   EYES: normal sclera and conjunctiva, non-icteric, no cataracts  SKIN: denies any unusual skin lesions, rash, itchiness  HEENT: denies nasal congestion, sinus pain or sore throat; denies blurred vision, visual changes  CARDIOVASCULAR: denies chest pain   LUNGS:  denies shortness of breath   GI: see HPI  : see HPI  MUSCULOSKELETAL: denies back pain, muscle or joint aches  NEUROLOGIC: denies headaches  PSYCHIATRIC: denies depression or anxiety, mood is good  HEMATOLOGIC: denies history of anemia  ENDOCRINE: denies thyroid history, cold/heat intolerance  ALLERGIC: denies allergy symptoms    EXAM:   VITALS: /72 (BP Location: Left arm)   Pulse 96   Temp 98.7 °F (37.1 °C) (Axillary)   Resp 16   Ht 5' 6\" (1.676 m)   Wt 130 lb (59 kg)   BMI 20.98 kg/m²   GENERAL:  Well-appearing, no apparent distress, well nourished,  well developed  SKIN: Normal, no rashes, no acne, no hirsutism, no ulcers  HEAD: Atraumatic, normocephalic  EYES: Normal extraocular movements, conjunctivae clear  NECK: No masses, symmetric  BACK: Symmetric, normal curvature, mildly tender to palpation of right ribs  LUNGS: Unlabored breathing, no wheezing  HEART: Regular rate and rhythm  ABD: Soft, nontender and not distended, no masses, no hernia, normal bowel sounds. 32 weeks gestation.  Neg murphys sign. Pain is right below right ribs radiating to right flank.  EXTREMITIES: No edema, no lesions  MUSCULOSKELETAL: Grossly normal muscle tone and range of motion  PSYCHIATRIC: Patient oriented to time, place and person; mood and affect appropriate    ASSESSMENT AND PLAN:   Martina Centeno is a 33 year old female who presents with complaint of epigastric pain, headache in pregnancy. 24 hour urine protein is elevated but blood pressures and labs are totally normal (except three pressures within 20 minutes when she was first in triage on Monday). She has a history of migraines. Given the unclear picture of preeclampsia, will admit overnight for observation.    -serial BPs  -repeat labs  -US RUQ and kidneys given radiation of pain to flank--rule out kidney stone. No si/sx pyelo  -pepcid BID, tylenol for headache. Advised to PO hydrate and rest to help the headache  -NST BID  -betamethasone in case of severe preeclampsia diagnosis    If repeat labs and BPs normal and headache improves, then would consider her to have proteinuria and epigastric pain (likely gastritis) without preeclampsia and discharge home.    -Continue sertraline  -continue vaginal progesterone  -gen diet, no saline lock needed      There are no diagnoses linked to this encounter.    Pt voiced understanding of all instructions; all questions answered; no barriers to learning.    Maritza Cohen MD  [facility]  Office: [phone]  Page via [employer]

## 2024-01-24 NOTE — PROGRESS NOTES
Pt admitted for observation, transferred to  118 via ambulatory in stable condition w/ RN and  @ side. Report given to DILIP Winston.

## 2024-01-25 ENCOUNTER — ULTRASOUND ENCOUNTER (OUTPATIENT)
Dept: PERINATAL CARE | Facility: HOSPITAL | Age: 34
End: 2024-01-25
Attending: OBSTETRICS & GYNECOLOGY
Payer: COMMERCIAL

## 2024-01-25 DIAGNOSIS — O36.5990 POOR FETAL GROWTH AFFECTING MANAGEMENT OF MOTHER: Primary | ICD-10-CM

## 2024-01-25 PROBLEM — Z34.90 PREGNANCY: Status: ACTIVE | Noted: 2024-01-25

## 2024-01-25 PROBLEM — Z34.90 PREGNANCY (HCC): Status: ACTIVE | Noted: 2024-01-25

## 2024-01-25 LAB
GLUCOSE BLD-MCNC: 120 MG/DL (ref 70–99)
GLUCOSE BLD-MCNC: 134 MG/DL (ref 70–99)
GLUCOSE BLD-MCNC: 138 MG/DL (ref 70–99)
GLUCOSE BLD-MCNC: 168 MG/DL (ref 70–99)
HIV 1+2 AB+HIV1 P24 AG SERPL QL IA: NONREACTIVE

## 2024-01-25 PROCEDURE — 76816 OB US FOLLOW-UP PER FETUS: CPT | Performed by: OBSTETRICS & GYNECOLOGY

## 2024-01-25 PROCEDURE — 76820 UMBILICAL ARTERY ECHO: CPT

## 2024-01-25 PROCEDURE — 76819 FETAL BIOPHYS PROFIL W/O NST: CPT

## 2024-01-25 RX ORDER — BUTALBITAL, ACETAMINOPHEN AND CAFFEINE 50; 325; 40 MG/1; MG/1; MG/1
2 TABLET ORAL EVERY 6 HOURS PRN
Status: DISCONTINUED | OUTPATIENT
Start: 2024-01-25 | End: 2024-01-27

## 2024-01-25 NOTE — PROGRESS NOTES
Transferred patient to [facility] for US. Patient transferred via wheelchair in stable condition.

## 2024-01-25 NOTE — PROGRESS NOTES
[facility]  Antepartum    Martina Centeno Patient Status:  Observation    1990 MRN [id number]   Location [facility] LABOR & DELIVERY Attending Maritza Cohen MD   Hosp Day # 0 PCP Yemi Bryan MD     SUBJECTIVE:    Interval History: has complaints of some contractions.  Headache better.  No epigastric pain any longer        OBJECTIVE:    Vital signs in last 24 hours:  Temp:  [97.2 °F (36.2 °C)-98.7 °F (37.1 °C)] 98.3 °F (36.8 °C)  Pulse:  [] 96  Resp:  [14-16] 14  BP: (111-127)/(72-84) 118/81    Abd: soft, NTND  Ext: no c/c/e  Fetal Surveillance:  Fetal heart variability: moderate  Fetal Heart Rate accelerations: yes  Uterine contractions: irregular, every 10-20 minutes    Lab data:   Recent Labs   Lab 24  1548 24  1440   RBC 3.58* 3.58*   HGB 11.4* 11.4*   HCT 33.6* 33.5*   MCV 93.9 93.6   MCH 31.8 31.8   MCHC 33.9 34.0   RDW 13.0 13.2   NEPRELIM 9.32* 8.59*   WBC 13.3* 11.0   .0 296.0        Recent Labs   Lab 24  1548 24  1440   GLU 99 97   BUN 7* 7*   CREATSERUM 0.46* 0.52*   EGFRCR 130 126   CA 8.9 9.0   ALB 3.0* 3.0*    138   K 3.4* 3.7    108   CO2 25.0 26.0   ALKPHO 94 93   AST 5* 4*   BILT 0.2 0.2   TP 6.6 6.6         ASSESSMENT/PLAN:    1)IUP @ 32 2/7 weeks  2)headache/epigastric pain - resolved.  Has migraines.  Labs normal  3)proteinuria- BP's normal.  Follow up next week for repeat labs and NST.    4)FWB - reassuring; Complete betamethasone    Mitch Trinidad MD  2024  10:17 AM

## 2024-01-25 NOTE — CONSULTS
[facility]    Maternal-Fetal Medicine Inpatient Consultation    Date of Admission:  2024  Date of Consult:  2024    Reason for Consult:   Gestational proteinuria with suspected preeclampsia    History of Present Illness:  Martina Centeno is a a(n) 33 year old female  with an IUP at 32w2d and an ROSITA of Estimated Date of Delivery: 3/19/24 who  presented for admission yesterday after an elevated PCR and subsequent elevated 24-hour urine protein was noted (342 mg) the patient also noted headache.  In 2024 the patient had diastolic elevations in the 90s although her systolics were in the 120s-130s.    She was admitted yesterday for observation and her blood pressures have essentially been normal.  She has a history of migraine headaches and has had a recurrent headache which has not consistent with the same pattern as her migraines.      Review of History:      OB History:    OB History    Para Term  AB Living   3 2 1 1   2   SAB IAB Ectopic Multiple Live Births         0 2      # Outcome Date GA Lbr Steve/2nd Weight Sex Delivery Anes PTL Lv   3 Current            2 Term 19 38w6d 03:45 / 00:51 6 lb 0.3 oz (2.73 kg) M NORMAL SPONT EPI N ALEJA      Complications:  labor   1  18 35w4d 08:00 / 03:14 6 lb 5.2 oz (2.87 kg) M NORMAL SPONT EPI Y ALEJA      Birth Comments: Mitch Trinidad MD      Complications: Variable decelerations, Fetal bradycardia,  labor       Other Relevant History:  Past Medical History:   Diagnosis Date    Abdominal pain     u/s confirmed everything was okay    Acute cholecystitis 1/10/2019    Anemia     w pregnancy    Anxiety 2018    postpartum anxiety-treated xanax & zoloft (stopped taking when this preg started)    Cervical dysplasia     had bx    Mental disorder     anxiety    Migraines     Nausea & vomiting     Pancreas divisum      labor     Hx PTL and PTD @ 35 wks  pregnancy 2018     Past Surgical  History:   Procedure Laterality Date    CHOLECYSTECTOMY  01/31/2019    OTHER SURGICAL HISTORY  07/2019    oral surgery     UPPER GI ENDOSCOPY,BIOPSY  1/12/19= Bx: Normal     Family History   Problem Relation Age of Onset    Cancer Maternal Grandmother         Multiple, unkown    No Known Problems Father     Other (breast lump benign) Mother     Heart Disease Neg     Stroke Neg       reports that she has never smoked. She has never used smokeless tobacco. She reports that she does not currently use alcohol. She reports that she does not currently use drugs after having used the following drugs: Cannabis.    Allergies:  No Known Allergies    Medications:    Current Facility-Administered Medications:     butalbital-acetaminophen-caffeine (Fioricet) -40 MG per tab 2 tablet, 2 tablet, Oral, Q6H PRN    sertraline (Zoloft) tab 50 mg, 50 mg, Oral, Daily    docusate sodium (Colace) cap 100 mg, 100 mg, Oral, BID    calcium carbonate (Tums) chewable tab 1,000 mg, 1,000 mg, Oral, Daily PRN    prenatal vitamin with DHA (PNV with DHA) cap 1 capsule, 1 capsule, Oral, Daily    betamethasone sodium phosphate & acetate (Celestone) 6 (3-3) MG/ML injection 12 mg, 12 mg, Intramuscular, Q24H    famotidine (Pepcid) tab 20 mg, 20 mg, Oral, BID    acetaminophen (Tylenol) tab 650 mg, 650 mg, Oral, Q6H PRN **OR** acetaminophen (Tylenol) tab 650 mg, 650 mg, Oral, Q6H PRN    progesterone (Prometrium) cap 200 mg, 200 mg, Vaginal, Nightly    Physical Exam:  Temp:  [97.2 °F (36.2 °C)-98.3 °F (36.8 °C)] 98.3 °F (36.8 °C)  Pulse:  [] 102  Resp:  [14-16] 14  BP: (111-126)/(72-86) 125/86  Intake/Output                   01/23/24 0700 - 01/24/24 0659 (Not Admitted) 01/24/24 0700 - 01/25/24 0659 01/25/24 0700 - 01/26/24 0659       Intake    Total Intake -- -- --       Output    Urine  --  --  --    Urine Occurrence -- -- 3 x    Total Output -- -- --       Net I/O     -- -- --          Gen: No acute distress, alert and oriented x3  Abd:  Gravid abdomen, soft, nontender, non-distended  Cervix: deffered  Ext: SCD's in place    Laboratory Data:  Lab Results   Component Value Date    WBC 11.0 01/24/2024    HGB 11.4 01/24/2024    HCT 33.5 01/24/2024    .0 01/24/2024    CREATSERUM 0.52 01/24/2024    BUN 7 01/24/2024     01/24/2024    K 3.7 01/24/2024     01/24/2024    CO2 26.0 01/24/2024    GLU 97 01/24/2024    CA 9.0 01/24/2024    ALB 3.0 01/24/2024    ALKPHO 93 01/24/2024    BILT 0.2 01/24/2024    TP 6.6 01/24/2024    AST 4 01/24/2024    ALT  01/24/2024      Comment:      Due to  backorder we are temporarily unable to offer hospital-based ALT testing at [facility].   If urgently needed, please order ALT test code [id number].   The new order will need a new venipuncture and will be sent to [facility] for testing.   The expected turnaround time will be within 24 hours.        FETAL STATUS:  FHRT: 120 baseline, moderate variability, Reactive, decelerations: none  Uterine Contractions:  irregular    Ultrasound:  Ultrasound Findings:  Single IUP in cephalic presentation.    Placenta is anterior.   Cardiac activity is present at 131 bpm  EFW 1585 g ( 3 lb 8 oz); 21%.  AC 4%  JULIET is  15.8 cm.  MVP is 4.7 cm  BPP is 8/8.     Normal UA Doppler.     The fetal measurements are consistent with established EDC. No gross ultrasound evidence of structural abnormalities are seen today. The patient understands that ultrasound cannot rule out all structural and chromosomal abnormalities.       NARRATIVE:   PREECLAMPSIA    This patient has proteinuria (342 mg) and has had diastolic elevations on January 22, 2024.  It is unclear if her headache is related to preeclampsia or her migraines.  The pattern is not typical of her migraine pattern.  Has been taking Tylenol and Fioricet with limited relief.    I suspect this represents preeclampsia.  I will treat her as such.    Discussion  Preeclampsia refers to the new onset of hypertension and  proteinuria after 20 weeks of gestation in a previously normotensive woman.  Preeclampsia occurs in approximately 3 to 14 percent of all pregnancies worldwide, and about 5 to 8 percent in the [place].  The pathogenesis of preeclampsia is incompletely understood, but the disorder is clearly initiated by the presence of the trophoblast, and impaired placental angiogenesis plays an important role.   The clinical manifestations of preeclampsia can appear anytime from the second trimester to the first few weeks postpartum. The majority of cases of preeclampsia arise in low-risk nulliparous women without medical complications or identifiable risk factors.   Women with early, severe preeclampsia are at greatest risk of recurrence (25 to 65 percent), the incidence is much lower (5 to 7 percent) in women who had mild preeclampsia during the first pregnancy.               The ability to predict preeclampsia is currently of limited benefit because neither the development of the disorder nor its progression from mild to severe disease can be prevented in most patients, and there is no cure except delivery. Nevertheless, the accurate identification of women at risk, early diagnosis, and prompt and appropriate management will lead to an improvement in maternal, and possibly , outcome.  Risk factors for preeclampsia include: Nulliparity,  Preeclampsia in a previous pregnancy,  Age >40 years or <18 years,  Family history of pregnancy-induced hypertension, Chronic hypertension,  Chronic renal disease,  Antiphospholipid antibody syndrome or inherited thrombophilia, Vascular or connective tissue disease, Diabetes mellitus (pregestational and gestational),  Multifetal gestation, High body mass index,  Male partner whose previous partner had preeclampsia, Hydrops fetalis, and Unexplained fetal growth restriction.                 The weight of evidence indicates that inherited thrombophilias (such as Factor V Leiden  mutation, prothrombin gene mutation, protein C or S deficiency, antithrombin III deficiency) are not associated with preeclampsia. Therefore, screening pregnant women for inherited thrombophilias is not useful for predicting those at high risk of developing preeclampsia.  Antiphospholipid antibody syndrome is associated with development of severe early preeclampsia and screening is recommended.  Measurement of angiogenic factors (VEGF, PlGF, sFlt-1, Jena) in blood or urine is the most promising approach for predicting preeclampsia; however, these tests are investigational and are not available for clinical use at present.  Data from multiple observational studies suggest that preeclampsia predicts remote cardiovascular events (eg, hypertension, ischemic heart disease, stroke). Review of all of a woman's pregnancies is necessary to define her long-term risk accurately. Those with early onset severe preeclampsia, recurrent preeclampsia, gestational hypertension, or preeclampsia with onset as a multipara appear to be at highest risk of cardiovascular disease later in life, including during the premenopausal period. These women may have unrecognized latent hypertension, or other genetic or environmental factors predisposing them to hypertension during and subsequent to pregnancy. In contrast, preeclampsia/eclampsia occurring late in gestation in primigravid women and followed by a normotensive pregnancy does not appear to be associated with increased remote cardiovascular risk.       A 2015 meta-analysis of individual patient data from over 75,000 women with preeclampsia who became pregnant again found that 16 percent developed recurrent preeclampsia and 20 percent developed hypertension alone in a subsequent pregnancy.  The recurrence risk varies with the severity and time of onset of the initial episode.Women with early-onset, severe preeclampsia are at greatest risk of recurrence (as high as 25 to 65 percent). The  risk of preeclampsia in a second pregnancy is much lower (5 to 7 percent) for women who had preeclampsia without severe features in their first pregnancy and less than 1 percent in women who had a normotensive first pregnancy (does not apply to abortions). These relationships were illustrated by a series of 125 women with severe second-trimester preeclampsia followed for five years: 65 percent developed recurrent preeclampsia and 35 percent were normotensive in their subsequent pregnancy. Of the preeclamptic group, approximately one-third developed the disease at ?27 weeks, one-third at 28 to 36 weeks, and one-third at ?37 weeks. Thus, 21 percent of subsequent pregnancies were complicated by severe preeclampsia in the second trimester.  Recurrent preeclampsia is more likely following a preeclamptic mcneal pregnancy than a preeclamptic twin pregnancy.The recurrence risk in women with HELLP syndrome (who may develop either HELLP or preeclampsia in a subsequent pregnancy) is discussed separately.    Many studies have been performed to try to find a way to prevent preeclampsia.  These include the use of fish oil, vitamin C, Vitamin E, calcium and aspirin.  A few studies have shown benefit with aspirin but others have not.  Aspirin therapy is not recommended for women at low-risk for development of preeclampsia. We suggest use of low dose aspirin in women at moderate to high risk of developing preeclampsia, but no therapy is a reasonable alternative.   No drug prevents progression to more severe disease; use of any drug in this setting is not recommended.       FETAL GROWTH RESTRICTION    DISCUSSION    Background  Fetal growth restriction (FGR) is the final manifestation of a variety of maternal, fetal, and placental conditions. Fetal growth restriction occurs in up to 10% of pregnancies and is second to premature birth as a cause of infant morbidity and mortality. In addition to its significant  impact, FGR  also has an impact on long-term health outcomes. Fetal growth restriction remains a complex obstetric problem with disparate published diagnostic criteria, poor detection rates, and limited preventative and treatment options.     Definition  FGR is defined as a sonographic estimated fetal weight (EFW) or abdominal circumference (AC) below the 10th percentile for gestational age.    Classification  Timing  Early onset: < 32 weeks  Late-onset: ? 32 weeks    Severity  Severe: EFW<3%    Management  General Considerations  Management of FGR is based on early diagnosis, optimal fetal surveillance, and timely delivery that reduces  mortality and minimizes short- and long-term morbidity.     Evaluation  A detailed sonogram is advised as up to 20% of cases of early onset FGR are associated with fetal or chromosome abnormalities. In addition, evaluation for chromosomal abnormalities should also be offered to patients with early-onset FGR or those with FGR with a fetal malformation or polyhydramnios Diagnostic testing should include chromosome microarray analysis (CMA) and PCR for CMV.    Umbilical artery Dopplers, amniotic fluid volume (AFV) assessment and cardiotocography are the primary tools to assess fetal well-being in FGR fetuses.    Other testing modalities (including BPP, ductus venosus Dopplers, middle cerebral artery Dopplers and uterine artery Dopplers) have not adequately demonstrated benefit in improving outcomes of pregnancies complicated by FGR .    Guidelines    Initial Management   Detailed obstetric ultrasound examination (CPT code 51716)  Diagnostic Genetic Testing (CMA) for:  early-onset FGR   Sonographic abnormalities  Polyhydramnios  PCR CMV if patient has amniocentesis  UA Doppler  CTG  Deliver for repetitive late decelerations on CTG  Subsequent management based upon EFW, AC and UA Dopplers    Category  FGR (AC < 10%) with NORMAL UA Dopplers      IMPRESSION:   IUP at 32w2d  Suspect  preeclampsia  FGR (AC < 10%) with NORMAL UA Dopplers    RECOMMENDATIONS:   Complete betamethasone course  Continue inpatient observation for now  Preeclampsia labs tomorrow morning.  If severe preeclampsia by symptoms or lab changes, then delivery is indicated at that time.  If she only has severe range Bps with or without proteinuria (preeclampsia with severe features), we will closely monitor the patient and manage her with antihypertensive therapy with a goal of delivery at 34 weeks or until she develops lab changes or symptoms.  If her headache improves and blood pressures remain normal outpatient management may be considered tomorrow  Outpatient management recommendations:  Repeat growth ultrasound with UA Dopplers q 3 weeks  Twice-weekly antepartum monitoring consisting of weekly NSTs and weekly BPP's/UA Dopplers  Serum laboratory studies every 1-2 weeks to evaluate for progression to severe features of preeclampsia  Continue twice daily home blood pressure measurements  weekly review of blood pressure log  Notify physician if systolic BP's > 160's or diastolic BP's >105  Delivery at 37 weeks gestation or sooner should severe features of preeclampsia or gestational hypertension develop  Obviously an immediate delivery should a nonreassuring fetal status be noted    Ld Block M.D.  1/25/2024  2:02 PM    Thank you for allowing me to participate in the care of your patient.  Please do not hesitate to contact me if additional questions or concerns arise.      Total Time spent with patient and coordinating care:  60 minutes

## 2024-01-25 NOTE — IMAGING NOTE
Ultrasound Findings:  Single IUP in cephalic presentation.    Placenta is anterior.   Cardiac activity is present at 131 bpm  EFW 1585 g ( 3 lb 8 oz); 21%.  AC 4%  JULIET is  15.8 cm.  MVP is 4.7 cm  BPP is 8/8.     Normal UA Doppler.     The fetal measurements are consistent with established EDC. No gross ultrasound evidence of structural abnormalities are seen today. The patient understands that ultrasound cannot rule out all structural and chromosomal abnormalities.

## 2024-01-25 NOTE — PLAN OF CARE
Problem: Patient/Family Goals  Goal: Patient/Family Long Term Goal  Description: Patient's Long Term Goal: maintain pregnancy to fullterm gestation    Interventions:    - See additional Care Plan goals for specific interventions  Outcome: Progressing  Goal: Patient/Family Short Term Goal  Description: Patient's Short Term Goal: effective pain and anxiety management    Interventions:   -   - See additional Care Plan goals for specific interventions  Outcome: Progressing     Problem: ANTEPARTUM/LABOR and DELIVERY  Goal: Maintain pregnancy as long as maternal and/or fetal condition is stable  Description: INTERVENTIONS:  - Maternal surveillance  - Fetal surveillance  - Monitor uterine activity  - Medications as ordered  - Bedrest  Outcome: Progressing  Goal: Anxiety is at manageable level  Description: INTERVENTIONS:  - [place] patient to unit/surroundings  - Establish a trusting relationship with patient  - Discuss possible complications or alterations in birth plan  - Explain treatment plan  - Explain testing/procedures prior to initiation  - Encourage participation in care  - Encourage verbalization of concerns/fears  - Identify coping mechanisms  - Administer/offer alternative therapies (Aroma therapy, distraction, guided imagery, massage, music therapy, therapeutic touch)  - Manage patient's environment (Avoid overstimulation of patient)  Outcome: Progressing  Goal: Demonstrates ability to cope with hospitalization/illness  Description: INTERVENTIONS:  - Encourage verbalization of feelings/concerns/expectations  - Provide quiet environment  - Assist patient to identify own strengths and abilities  - Encourage patient to set small goals for self  - Encourage participation in diversional activity  - Reinforce positive adaptation of new coping behaviors  - Include patient/family/caregiver in decisions  Outcome: Progressing

## 2024-01-26 ENCOUNTER — APPOINTMENT (OUTPATIENT)
Dept: MRI IMAGING | Facility: HOSPITAL | Age: 34
End: 2024-01-26
Attending: NURSE PRACTITIONER
Payer: COMMERCIAL

## 2024-01-26 PROBLEM — R51.9 ACUTE INTRACTABLE HEADACHE: Status: ACTIVE | Noted: 2024-01-26

## 2024-01-26 LAB
ALBUMIN SERPL-MCNC: 3.2 G/DL (ref 3.4–5)
ALBUMIN/GLOB SERPL: 0.9 {RATIO} (ref 1–2)
ALP LIVER SERPL-CCNC: 96 U/L
ANION GAP SERPL CALC-SCNC: 7 MMOL/L (ref 0–18)
AST SERPL-CCNC: 15 U/L (ref 15–37)
BASOPHILS # BLD AUTO: 0.02 X10(3) UL (ref 0–0.2)
BASOPHILS NFR BLD AUTO: 0.1 %
BILIRUB SERPL-MCNC: 0.2 MG/DL (ref 0.1–2)
BUN BLD-MCNC: 9 MG/DL (ref 9–23)
CALCIUM BLD-MCNC: 8.5 MG/DL (ref 8.5–10.1)
CHLORIDE SERPL-SCNC: 112 MMOL/L (ref 98–112)
CO2 SERPL-SCNC: 21 MMOL/L (ref 21–32)
CREAT BLD-MCNC: 0.5 MG/DL
EGFRCR SERPLBLD CKD-EPI 2021: 127 ML/MIN/1.73M2 (ref 60–?)
EOSINOPHIL # BLD AUTO: 0 X10(3) UL (ref 0–0.7)
EOSINOPHIL NFR BLD AUTO: 0 %
ERYTHROCYTE [DISTWIDTH] IN BLOOD BY AUTOMATED COUNT: 13.3 %
GLOBULIN PLAS-MCNC: 3.7 G/DL (ref 2.8–4.4)
GLUCOSE BLD-MCNC: 102 MG/DL (ref 70–99)
GLUCOSE BLD-MCNC: 110 MG/DL (ref 70–99)
GLUCOSE BLD-MCNC: 124 MG/DL (ref 70–99)
GLUCOSE BLD-MCNC: 134 MG/DL (ref 70–99)
HCT VFR BLD AUTO: 34 %
HGB BLD-MCNC: 11.8 G/DL
IMM GRANULOCYTES # BLD AUTO: 0.09 X10(3) UL (ref 0–1)
IMM GRANULOCYTES NFR BLD: 0.6 %
LYMPHOCYTES # BLD AUTO: 1.54 X10(3) UL (ref 1–4)
LYMPHOCYTES NFR BLD AUTO: 9.8 %
MCH RBC QN AUTO: 32.6 PG (ref 26–34)
MCHC RBC AUTO-ENTMCNC: 34.7 G/DL (ref 31–37)
MCV RBC AUTO: 93.9 FL
MONOCYTES # BLD AUTO: 0.8 X10(3) UL (ref 0.1–1)
MONOCYTES NFR BLD AUTO: 5.1 %
NEUTROPHILS # BLD AUTO: 13.23 X10 (3) UL (ref 1.5–7.7)
NEUTROPHILS # BLD AUTO: 13.23 X10(3) UL (ref 1.5–7.7)
NEUTROPHILS NFR BLD AUTO: 84.4 %
OSMOLALITY SERPL CALC.SUM OF ELEC: 289 MOSM/KG (ref 275–295)
PLATELET # BLD AUTO: 331 10(3)UL (ref 150–450)
POTASSIUM SERPL-SCNC: 3.6 MMOL/L (ref 3.5–5.1)
PROT SERPL-MCNC: 6.9 G/DL (ref 6.4–8.2)
RBC # BLD AUTO: 3.62 X10(6)UL
SODIUM SERPL-SCNC: 140 MMOL/L (ref 136–145)
WBC # BLD AUTO: 15.7 X10(3) UL (ref 4–11)

## 2024-01-26 PROCEDURE — 70544 MR ANGIOGRAPHY HEAD W/O DYE: CPT | Performed by: NURSE PRACTITIONER

## 2024-01-26 PROCEDURE — 70551 MRI BRAIN STEM W/O DYE: CPT | Performed by: NURSE PRACTITIONER

## 2024-01-26 PROCEDURE — 99223 1ST HOSP IP/OBS HIGH 75: CPT | Performed by: OTHER

## 2024-01-26 RX ORDER — ACETAMINOPHEN 500 MG
1000 TABLET ORAL EVERY 6 HOURS PRN
Status: DISCONTINUED | OUTPATIENT
Start: 2024-01-26 | End: 2024-01-26

## 2024-01-26 RX ORDER — SODIUM CHLORIDE, SODIUM LACTATE, POTASSIUM CHLORIDE, CALCIUM CHLORIDE 600; 310; 30; 20 MG/100ML; MG/100ML; MG/100ML; MG/100ML
INJECTION, SOLUTION INTRAVENOUS CONTINUOUS
Status: DISCONTINUED | OUTPATIENT
Start: 2024-01-26 | End: 2024-01-27

## 2024-01-26 RX ORDER — HYDROMORPHONE HYDROCHLORIDE 1 MG/ML
1 INJECTION, SOLUTION INTRAMUSCULAR; INTRAVENOUS; SUBCUTANEOUS ONCE
Status: COMPLETED | OUTPATIENT
Start: 2024-01-26 | End: 2024-01-26

## 2024-01-26 RX ORDER — CHOLECALCIFEROL (VITAMIN D3) 25 MCG
1 TABLET,CHEWABLE ORAL NIGHTLY
Status: DISCONTINUED | OUTPATIENT
Start: 2024-01-26 | End: 2024-01-27

## 2024-01-26 RX ORDER — OXYCODONE HYDROCHLORIDE 5 MG/1
5 TABLET ORAL ONCE
Status: COMPLETED | OUTPATIENT
Start: 2024-01-26 | End: 2024-01-26

## 2024-01-26 RX ORDER — CALCIUM CARBONATE 500 MG/1
1000 TABLET, CHEWABLE ORAL 3 TIMES DAILY PRN
Status: DISCONTINUED | OUTPATIENT
Start: 2024-01-26 | End: 2024-01-27

## 2024-01-26 NOTE — PLAN OF CARE
Problem: Patient/Family Goals  Goal: Patient/Family Long Term Goal  Description: Patient's Long Term Goal: maintain pregnancy to fullterm gestation    Interventions:    - See additional Care Plan goals for specific interventions  Outcome: Progressing  Goal: Patient/Family Short Term Goal  Description: Patient's Short Term Goal: effective pain and anxiety management    Interventions:   -   - See additional Care Plan goals for specific interventions  Outcome: Progressing     Problem: ANTEPARTUM/LABOR and DELIVERY  Goal: Maintain pregnancy as long as maternal and/or fetal condition is stable  Description: INTERVENTIONS:  - Maternal surveillance  - Fetal surveillance  - Monitor uterine activity  - Medications as ordered  - Bedrest  Outcome: Progressing  Goal: Anxiety is at manageable level  Description: INTERVENTIONS:  - [place] patient to unit/surroundings  - Establish a trusting relationship with patient  - Discuss possible complications or alterations in birth plan  - Explain treatment plan  - Explain testing/procedures prior to initiation  - Encourage participation in care  - Encourage verbalization of concerns/fears  - Identify coping mechanisms  - Administer/offer alternative therapies (Aroma therapy, distraction, guided imagery, massage, music therapy, therapeutic touch)  - Manage patient's environment (Avoid overstimulation of patient)  Outcome: Progressing  Goal: Demonstrates ability to cope with hospitalization/illness  Description: INTERVENTIONS:  - Encourage verbalization of feelings/concerns/expectations  - Provide quiet environment  - Assist patient to identify own strengths and abilities  - Encourage patient to set small goals for self  - Encourage participation in diversional activity  - Reinforce positive adaptation of new coping behaviors  - Include patient/family/caregiver in decisions  Outcome: Progressing

## 2024-01-26 NOTE — CONSULTS
[facility]    Maternal Fetal Medicine Progress Note    Martina Centeno Patient Status:  Inpatient    1990 MRN [id number]   [facility] ED LABOR & DELIVERY Attending Maritza Cohen MD   Hosp Day # 1 PCP Yemi Bryan MD     SUBJECTIVE:  Good fetal movement.  Complaining of pain in her shoulder region likely secondary to being in the hospital bed.  Headache is less severe today.  She reports that it is a pressure feeling in the top of her head.  The visual change she described to me is that when she opens her eyes after blinking she sees a couple black spots/and then it clears.    She also expressed being nervous about the fetal growth.  I reviewed with her fetal growth restriction and that the fluid, activity, and umbilical artery Dopplers are all normal which is reassuring.  We discussed normal growth variation.  Her 2 prior children were both about 6 pounds which is consistent with where this baby is projected at term.    OBJECTIVE:  Temp:  [98.1 °F (36.7 °C)-98.3 °F (36.8 °C)] 98.1 °F (36.7 °C)  Pulse:  [84-96] 91  Resp:  [14-20] 20  BP: (119-124)/(68-87) 122/78  Intake/Output                   24 0700 - 24 0659 24 0700 - 24 0659 24 0700 - 24 0659       Intake    Total Intake -- -- --       Output    Urine  --  --  --    Urine Occurrence -- 3 x --    Total Output -- -- --       Net I/O     -- -- --            Physical Exam:  Gen: No acute distress, alert and oriented x3  Abd: Gravid abdomen, soft, nontender, non-distended  Cervix: deffered  Ext: SCD's in place    Labs:   Lab Results   Component Value Date    WBC 15.7 2024    HGB 11.8 2024    HCT 34.0 2024    .0 2024    CREATSERUM 0.50 2024    BUN 9 2024     2024    K 3.6 2024     2024    CO2 21.0 2024     2024    CA 8.5 2024    ALB 3.2 2024    ALKPHO 96 2024    BILT 0.2 2024    TP 6.9  2024    AST 15 2024    ALT  2024      Comment:      Due to  backorder we are temporarily unable to offer hospital-based ALT testing at [facility].   If urgently needed, please order ALT test code [id number].   The new order will need a new venipuncture and will be sent to [facility] for testing.   The expected turnaround time will be within 24 hours.        FETAL STATUS: evening 24  FHRT: 120 baseline, moderate variability, Reactive, decelerations: none  Uterine Contractions: none    IMPRESSION:   IUP at 32w3d  Headache  Proteinuria without HTN  Fetal growth restriction diagnosed on 1225, reassuring  testing  Low suspicion for preeclampsia based on normal blood pressures and normal labs other than mild proteinuria    RECOMENDATIONS:   Consultation to neurology   If severe preeclampsia develops by symptoms or lab changes, then delivery is indicated at that time.  If she only has severe range Bps with or without proteinuria (preeclampsia with severe features), we will closely monitor the patient and manage her with antihypertensive therapy with a goal of delivery at 34 weeks or until she develops lab changes or symptoms.  If her headache improves and blood pressures remain normal outpatient management may be considered   Outpatient management recommendations:  Repeat growth ultrasound with UA Dopplers q 3 weeks  Twice-weekly antepartum monitoring consisting of weekly NSTs and weekly BPP's/UA Dopplers  Serum laboratory studies every 1-2 weeks to evaluate for progression to severe features of preeclampsia  Continue twice daily home blood pressure measurements  weekly review of blood pressure log  Notify physician if systolic BP's > 160's or diastolic BP's >105  Delivery at 37 weeks gestation or sooner should severe features of preeclampsia or gestational hypertension develop  Obviously an immediate delivery should a nonreassuring fetal status be noted     Questions/concerns  were discussed with patient and/or family at bedside.  D/w Dr. Moses  Total Time spent with patient and coordinating care:  35 minutes      Judy Schmid M.D.  1/26/2024  1:25 PM (late entry from 7:45 AM)

## 2024-01-26 NOTE — PLAN OF CARE
Problem: Patient/Family Goals  Goal: Patient/Family Long Term Goal  Description: Patient's Long Term Goal: maintain pregnancy to fullterm gestation    Interventions:    - See additional Care Plan goals for specific interventions  Outcome: Progressing  Goal: Patient/Family Short Term Goal  Description: Patient's Short Term Goal: effective pain and anxiety management    Interventions:   -   - See additional Care Plan goals for specific interventions  Outcome: Progressing     Problem: ANTEPARTUM/LABOR and DELIVERY  Goal: Maintain pregnancy as long as maternal and/or fetal condition is stable  Description: INTERVENTIONS:  - Maternal surveillance  - Fetal surveillance  - Monitor uterine activity  - Medications as ordered  - Bedrest  Outcome: Progressing  Goal: Anxiety is at manageable level  Description: INTERVENTIONS:  - [place] patient to unit/surroundings  - Establish a trusting relationship with patient  - Discuss possible complications or alterations in birth plan  - Explain treatment plan  - Explain testing/procedures prior to initiation  - Encourage participation in care  - Encourage verbalization of concerns/fears  - Identify coping mechanisms  - Administer/offer alternative therapies (Aroma therapy, distraction, guided imagery, massage, music therapy, therapeutic touch)  - Manage patient's environment (Avoid overstimulation of patient)  Outcome: Progressing  Goal: Demonstrates ability to cope with hospitalization/illness  Description: INTERVENTIONS:  - Encourage verbalization of feelings/concerns/expectations  - Provide quiet environment  - Assist patient to identify own strengths and abilities  - Encourage patient to set small goals for self  - Encourage participation in diversional activity  - Reinforce positive adaptation of new coping behaviors  - Include patient/family/caregiver in decisions  Outcome: Progressing

## 2024-01-26 NOTE — PROGRESS NOTES
[facility]  Antepartum    Martina Centeno Patient Status:  Inpatient    1990 MRN [id number]   Location [facility] LABOR & DELIVERY Attending Maritza Cohen MD   Hosp Day # 1 PCP Yemi Bryan MD     SUBJECTIVE:    Interval History: continues to have headache, mostly located behind her eyes and pressure in the top of her head. No nausea/vomiting, eating/drinking without difficulty. No photophobia or phonophobia. No RUQ pain, feeling movement. No bleeding or contractions    OBJECTIVE:    Vital signs in last 24 hours:  Temp:  [98.1 °F (36.7 °C)-98.3 °F (36.8 °C)] 98.1 °F (36.7 °C)  Pulse:  [84-96] 91  Resp:  [14-20] 20  BP: (119-124)/(68-87) 122/78  Gen: AAOx3, NAD. Resting in bed  Abd: soft NTND, gravid  Ext: no edema  Fetal Surveillance: reactive    Lab data:    Recent Results (from the past 72 hour(s))   Comp Metabolic Panel (14)    Collection Time: 24  2:40 PM   Result Value Ref Range    Glucose 97 70 - 99 mg/dL    Sodium 138 136 - 145 mmol/L    Potassium 3.7 3.5 - 5.1 mmol/L    Chloride 108 98 - 112 mmol/L    CO2 26.0 21.0 - 32.0 mmol/L    Anion Gap 4 0 - 18 mmol/L    BUN 7 (L) 9 - 23 mg/dL    Creatinine 0.52 (L) 0.55 - 1.02 mg/dL    Calcium, Total 9.0 8.5 - 10.1 mg/dL    Calculated Osmolality 284 275 - 295 mOsm/kg    eGFR-Cr 126 >=60 mL/min/1.73m2    AST 4 (L) 15 - 37 U/L    ALT      Alkaline Phosphatase 93 37 - 98 U/L    Bilirubin, Total 0.2 0.1 - 2.0 mg/dL    Total Protein 6.6 6.4 - 8.2 g/dL    Albumin 3.0 (L) 3.4 - 5.0 g/dL    Globulin  3.6 2.8 - 4.4 g/dL    A/G Ratio 0.8 (L) 1.0 - 2.0   Uric Acid    Collection Time: 24  2:40 PM   Result Value Ref Range    Uric Acid 3.0 2.6 - 6.0 mg/dL   CBC W/ DIFFERENTIAL    Collection Time: 24  2:40 PM   Result Value Ref Range    WBC 11.0 4.0 - 11.0 x10(3) uL    RBC 3.58 (L) 3.80 - 5.30 x10(6)uL    HGB 11.4 (L) 12.0 - 16.0 g/dL    HCT 33.5 (L) 35.0 - 48.0 %    .0 150.0 - 450.0 10(3)uL    MCV 93.6 80.0 - 100.0 fL    MCH 31.8 26.0 -  34.0 pg    MCHC 34.0 31.0 - 37.0 g/dL    RDW 13.2 %    Neutrophil Absolute Prelim 8.59 (H) 1.50 - 7.70 x10 (3) uL    Neutrophil Absolute 8.59 (H) 1.50 - 7.70 x10(3) uL    Lymphocyte Absolute 1.67 1.00 - 4.00 x10(3) uL    Monocyte Absolute 0.57 0.10 - 1.00 x10(3) uL    Eosinophil Absolute 0.12 0.00 - 0.70 x10(3) uL    Basophil Absolute 0.02 0.00 - 0.20 x10(3) uL    Immature Granulocyte Absolute 0.04 0.00 - 1.00 x10(3) uL    Neutrophil % 77.9 %    Lymphocyte % 15.2 %    Monocyte % 5.2 %    Eosinophil % 1.1 %    Basophil % 0.2 %    Immature Granulocyte % 0.4 %   RAPID HIV    Collection Time: 01/24/24  2:40 PM   Result Value Ref Range    Rapid HIV Ag/Ab Combo Nonreactive Nonreactive   Urinalysis, Routine    Collection Time: 01/24/24  4:24 PM   Result Value Ref Range    Urine Color Light-Yellow Yellow    Clarity Urine Clear Clear    Spec Gravity 1.010 1.005 - 1.030    Glucose Urine Normal Normal mg/dL    Bilirubin Urine Negative Negative    Ketones Urine Negative Negative mg/dL    Blood Urine Negative Negative    pH Urine 7.0 5.0 - 8.0    Protein Urine Negative Negative mg/dL    Urobilinogen Urine Normal Normal mg/dL    Nitrite Urine Negative Negative    Leukocyte Esterase Urine Negative Negative    Microscopic Microscopic not indicated    POCT Glucose    Collection Time: 01/24/24  5:29 PM   Result Value Ref Range    POC Glucose 135 (H) 70 - 99 mg/dL   POCT Glucose    Collection Time: 01/24/24  9:39 PM   Result Value Ref Range    POC Glucose 154 (H) 70 - 99 mg/dL   POCT Glucose    Collection Time: 01/25/24  5:34 AM   Result Value Ref Range    POC Glucose 120 (H) 70 - 99 mg/dL   POCT Glucose    Collection Time: 01/25/24 12:36 PM   Result Value Ref Range    POC Glucose 134 (H) 70 - 99 mg/dL   POCT Glucose    Collection Time: 01/25/24  5:44 PM   Result Value Ref Range    POC Glucose 138 (H) 70 - 99 mg/dL   POCT Glucose    Collection Time: 01/25/24  9:23 PM   Result Value Ref Range    POC Glucose 168 (H) 70 - 99 mg/dL   Comp  Metabolic Panel (14)    Collection Time: 01/26/24  6:53 AM   Result Value Ref Range    Glucose 102 (H) 70 - 99 mg/dL    Sodium 140 136 - 145 mmol/L    Potassium 3.6 3.5 - 5.1 mmol/L    Chloride 112 98 - 112 mmol/L    CO2 21.0 21.0 - 32.0 mmol/L    Anion Gap 7 0 - 18 mmol/L    BUN 9 9 - 23 mg/dL    Creatinine 0.50 (L) 0.55 - 1.02 mg/dL    Calcium, Total 8.5 8.5 - 10.1 mg/dL    Calculated Osmolality 289 275 - 295 mOsm/kg    eGFR-Cr 127 >=60 mL/min/1.73m2    AST 15 15 - 37 U/L    ALT      Alkaline Phosphatase 96 37 - 98 U/L    Bilirubin, Total 0.2 0.1 - 2.0 mg/dL    Total Protein 6.9 6.4 - 8.2 g/dL    Albumin 3.2 (L) 3.4 - 5.0 g/dL    Globulin  3.7 2.8 - 4.4 g/dL    A/G Ratio 0.9 (L) 1.0 - 2.0   CBC W/ DIFFERENTIAL    Collection Time: 01/26/24  6:53 AM   Result Value Ref Range    WBC 15.7 (H) 4.0 - 11.0 x10(3) uL    RBC 3.62 (L) 3.80 - 5.30 x10(6)uL    HGB 11.8 (L) 12.0 - 16.0 g/dL    HCT 34.0 (L) 35.0 - 48.0 %    .0 150.0 - 450.0 10(3)uL    MCV 93.9 80.0 - 100.0 fL    MCH 32.6 26.0 - 34.0 pg    MCHC 34.7 31.0 - 37.0 g/dL    RDW 13.3 %    Neutrophil Absolute Prelim 13.23 (H) 1.50 - 7.70 x10 (3) uL    Neutrophil Absolute 13.23 (H) 1.50 - 7.70 x10(3) uL    Lymphocyte Absolute 1.54 1.00 - 4.00 x10(3) uL    Monocyte Absolute 0.80 0.10 - 1.00 x10(3) uL    Eosinophil Absolute 0.00 0.00 - 0.70 x10(3) uL    Basophil Absolute 0.02 0.00 - 0.20 x10(3) uL    Immature Granulocyte Absolute 0.09 0.00 - 1.00 x10(3) uL    Neutrophil % 84.4 %    Lymphocyte % 9.8 %    Monocyte % 5.1 %    Eosinophil % 0.0 %    Basophil % 0.1 %    Immature Granulocyte % 0.6 %   POCT Glucose    Collection Time: 01/26/24  6:59 AM   Result Value Ref Range    POC Glucose 110 (H) 70 - 99 mg/dL   POCT Glucose    Collection Time: 01/26/24 12:38 PM   Result Value Ref Range    POC Glucose 134 (H) 70 - 99 mg/dL     ASSESSMENT/PLAN:    IUP @ 32w3d admitted for headache. Pre-eclampsia vs atypical migraine with gestational proteinuria  -- earlier in the week had  some mildly elevated pressures but normal since admission  -- labs today stable and WNL, repeat tomorrow  -- has been on fioricet and dilaudid for pain, continue as needed  -- consult neurology for further recommendations on migraine evaluation/treatment  -- we reviewed possible pre-eclampsia and headache is a consequence of this which would be inpatient monitoring and delivery at 34 weeks. We discussed early delivery and complications associated with prematurity briefly. We also discussed atypical migraines.   2. FGR with lagging AC -- normal dopplers. Will repeat growth in 3 weeks    Continue inpatient management    Lizette Moses D.O.  [facility]  Contact via [employer] or cell

## 2024-01-26 NOTE — CONSULTS
[facility]   NEUROLOGY   CONSULT NOTE    Admission date: 2024  Reason for Consult: Headache, confusion  Chief Complaint:   Chief Complaint   Patient presents with     Assessment   ________________________________________________________________    History     History of Presenting Illness  33 year old female  3 para 2 presently 32 weeks consulted for headache.  Patient states that she has longstanding history of migraines.  However, the current characteristic of headaches are different from her usual migraine.  Patient is currently having frontal headache and pressure while she usually has retro-orbital pain.  There is also no photophobia or phonophobia which is usually present with her migraines.  Patient is complaining of scintillating scotomas this morning.  Denies diplopia, dysarthria, dysphagia, new weakness, numbness, paresthesias, nausea or vomiting.  Patient is recently noted to have trace protein in her urine and is considered for preeclampsia.  Patient has no history of seizures, miscarriages, DVTs or smoking or recent use of oral contraceptives.    History obtained from patient and chart review.    Past Medical History:   Diagnosis Date    Abdominal pain     u/s confirmed everything was okay    Acute cholecystitis 1/10/2019    Anemia     w pregnancy    Anxiety 2018    postpartum anxiety-treated xanax & zoloft (stopped taking when this preg started)    Cervical dysplasia     had bx    Mental disorder     anxiety    Migraines     Nausea & vomiting     Pancreas divisum      labor     Hx PTL and PTD @ 35 wks  pregnancy      Past Surgical History:   Procedure Laterality Date    CHOLECYSTECTOMY  2019    OTHER SURGICAL HISTORY  2019    oral surgery     UPPER GI ENDOSCOPY,BIOPSY  19= Bx: Normal     Social History     Socioeconomic History    Marital status:     Number of children: 0   Occupational History    Occupation:     Tobacco Use    Smoking status: Never    Smokeless tobacco: Never   Vaping Use    Vaping Use: Never used   Substance and Sexual Activity    Alcohol use: Not Currently     Alcohol/week: 0.0 standard drinks of alcohol     Comment: occasional    Drug use: Not Currently     Types: Cannabis     Comment: Past use     Sexual activity: Yes     Partners: Male     Social Determinants of Health     Financial Resource Strain: Low Risk  (1/24/2024)    Financial Resource Strain     Difficulty of Paying Living Expenses: Not hard at all     Med Affordability: No   Food Insecurity: No Food Insecurity (1/24/2024)    Food Insecurity     Food Insecurity: Never true   Transportation Needs: No Transportation Needs (1/24/2024)    Transportation Needs     Lack of Transportation: No   Stress: No Stress Concern Present (1/24/2024)    Stress     Feeling of Stress : No   Housing Stability: Low Risk  (1/24/2024)    Housing Stability     Housing Instability: No     Family History   Problem Relation Age of Onset    Cancer Maternal Grandmother         Multiple, unkown    No Known Problems Father     Other (breast lump benign) Mother     Heart Disease Neg     Stroke Neg      Allergies No Known Allergies    Home Meds  Current Outpatient Medications   Medication Instructions    prenatal vitamin with DHA 27-0.8-228 MG Oral Cap 1 capsule, Oral, Daily    progesterone (PROMETRIUM) 200 mg, Vaginal, Nightly    sertraline (ZOLOFT) 50 mg, Oral, Daily     Scheduled Meds:   prenatal vitamin with DHA  1 capsule Oral Nightly    sertraline  50 mg Oral Daily    docusate sodium  100 mg Oral BID    famotidine  20 mg Oral BID    progesterone  200 mg Vaginal Nightly     Continuous Infusions:   lactated ringers 125 mL/hr at 01/26/24 0635     PRN Meds:acetaminophen, butalbital-acetaminophen-caffeine, calcium carbonate, acetaminophen **OR** acetaminophen    OBJECTIVE   VITAL SIGNS:   Temp:  [98.1 °F (36.7 °C)-98.3 °F (36.8 °C)] 98.2 °F (36.8 °C)  Pulse:   [84-96] 95  Resp:  [14-20] 18  BP: (100-124)/(50-87) 100/50    PHYSICAL EXAM:    NEUROLOGIC:    Mental Status:  A&O x 4, Follows simple commands, no obvious aphasia or dysarthria  Cranial nerves: PERRL.  Visual fields full.  EOMI.  Face symmetric with normal movement bilaterally.  Hearing grossly intact. Tongue midline with normal movements.   Motor: Drift:  Absent; Motor exam is 5 out of 5 in all extremities bilaterally  Sensation: Intact to light touch bilaterally  Cerebellar: Normal Finger-To-Nose test      LABORATORY DATA:  Last 24 hour labs were reviewed in detail.  Recent Labs   Lab 01/22/24  1548 01/24/24  1440 01/26/24  0653    138 140   K 3.4* 3.7 3.6    108 112   CO2 25.0 26.0 21.0   GLU 99 97 102*   BUN 7* 7* 9   CREATSERUM 0.46* 0.52* 0.50*     Recent Labs   Lab 01/22/24  1548 01/24/24  1440 01/26/24  0653   WBC 13.3* 11.0 15.7*   HGB 11.4* 11.4* 11.8*   .0 296.0 331.0     Recent Labs   Lab 01/22/24  1548 01/24/24  1440 01/26/24  0653   AST 5* 4* 15     No results for input(s): \"MG\", \"PHOS\" in the last 168 hours.  Last A1c value was  % done  .           Radiology:    No results found.  ASSESSMENT/PLAN   33 year old female with:    New symptoms of headache and the patient suspected of preeclampsia.  Advised MRI of the brain with MRV for evaluation of sinus venous thrombosis.  Also advise EEG to evaluate for any focal epileptiform activity.  Pain management as per patient's primary team's recommendation.  Patient and her  was counseled.  All questions answered.  Nursing staff appraised.      Active Problems:    Epigastric pain during pregnancy, antepartum    Pregnancy       Anupam Das MD  Neurohospitalist  [facility]    Disclaimer: This record was dictated using Dragon software. There may be errors due to voice recognition problems that were not realized and corrected during the completion of the note.

## 2024-01-27 VITALS
BODY MASS INDEX: 20.89 KG/M2 | WEIGHT: 130 LBS | HEIGHT: 66 IN | TEMPERATURE: 98 F | DIASTOLIC BLOOD PRESSURE: 83 MMHG | RESPIRATION RATE: 20 BRPM | SYSTOLIC BLOOD PRESSURE: 133 MMHG | HEART RATE: 106 BPM

## 2024-01-27 LAB
ALBUMIN SERPL-MCNC: 2.6 G/DL (ref 3.4–5)
ALBUMIN/GLOB SERPL: 0.8 {RATIO} (ref 1–2)
ALP LIVER SERPL-CCNC: 76 U/L
ANION GAP SERPL CALC-SCNC: 2 MMOL/L (ref 0–18)
ANTIBODY SCREEN: NEGATIVE
AST SERPL-CCNC: 7 U/L (ref 15–37)
BASOPHILS # BLD AUTO: 0.02 X10(3) UL (ref 0–0.2)
BASOPHILS NFR BLD AUTO: 0.2 %
BILIRUB SERPL-MCNC: 0.2 MG/DL (ref 0.1–2)
BUN BLD-MCNC: 9 MG/DL (ref 9–23)
CALCIUM BLD-MCNC: 8.3 MG/DL (ref 8.5–10.1)
CHLORIDE SERPL-SCNC: 109 MMOL/L (ref 98–112)
CO2 SERPL-SCNC: 29 MMOL/L (ref 21–32)
CREAT BLD-MCNC: 0.49 MG/DL
EGFRCR SERPLBLD CKD-EPI 2021: 128 ML/MIN/1.73M2 (ref 60–?)
EOSINOPHIL # BLD AUTO: 0.05 X10(3) UL (ref 0–0.7)
EOSINOPHIL NFR BLD AUTO: 0.5 %
ERYTHROCYTE [DISTWIDTH] IN BLOOD BY AUTOMATED COUNT: 13.5 %
GLOBULIN PLAS-MCNC: 3.2 G/DL (ref 2.8–4.4)
GLUCOSE BLD-MCNC: 107 MG/DL (ref 70–99)
GLUCOSE BLD-MCNC: 81 MG/DL (ref 70–99)
GLUCOSE BLD-MCNC: 83 MG/DL (ref 70–99)
HCT VFR BLD AUTO: 30.7 %
HGB BLD-MCNC: 10.1 G/DL
IMM GRANULOCYTES # BLD AUTO: 0.1 X10(3) UL (ref 0–1)
IMM GRANULOCYTES NFR BLD: 0.9 %
LYMPHOCYTES # BLD AUTO: 2.56 X10(3) UL (ref 1–4)
LYMPHOCYTES NFR BLD AUTO: 23.7 %
MCH RBC QN AUTO: 31.9 PG (ref 26–34)
MCHC RBC AUTO-ENTMCNC: 32.9 G/DL (ref 31–37)
MCV RBC AUTO: 96.8 FL
MONOCYTES # BLD AUTO: 0.9 X10(3) UL (ref 0.1–1)
MONOCYTES NFR BLD AUTO: 8.3 %
NEUTROPHILS # BLD AUTO: 7.15 X10 (3) UL (ref 1.5–7.7)
NEUTROPHILS # BLD AUTO: 7.15 X10(3) UL (ref 1.5–7.7)
NEUTROPHILS NFR BLD AUTO: 66.4 %
OSMOLALITY SERPL CALC.SUM OF ELEC: 288 MOSM/KG (ref 275–295)
PLATELET # BLD AUTO: 273 10(3)UL (ref 150–450)
POTASSIUM SERPL-SCNC: 3.9 MMOL/L (ref 3.5–5.1)
PROT SERPL-MCNC: 5.8 G/DL (ref 6.4–8.2)
RBC # BLD AUTO: 3.17 X10(6)UL
RH BLOOD TYPE: POSITIVE
SODIUM SERPL-SCNC: 140 MMOL/L (ref 136–145)
WBC # BLD AUTO: 10.8 X10(3) UL (ref 4–11)

## 2024-01-27 PROCEDURE — 99233 SBSQ HOSP IP/OBS HIGH 50: CPT | Performed by: OTHER

## 2024-01-27 PROCEDURE — 95816 EEG AWAKE AND DROWSY: CPT | Performed by: OTHER

## 2024-01-27 RX ORDER — DIPHENHYDRAMINE HYDROCHLORIDE 50 MG/ML
25 INJECTION INTRAMUSCULAR; INTRAVENOUS EVERY 4 HOURS PRN
Status: DISCONTINUED | OUTPATIENT
Start: 2024-01-27 | End: 2024-01-27

## 2024-01-27 RX ORDER — HYDROMORPHONE HYDROCHLORIDE 1 MG/ML
INJECTION, SOLUTION INTRAMUSCULAR; INTRAVENOUS; SUBCUTANEOUS
Status: COMPLETED
Start: 2024-01-27 | End: 2024-01-27

## 2024-01-27 RX ORDER — ONDANSETRON 2 MG/ML
4 INJECTION INTRAMUSCULAR; INTRAVENOUS EVERY 6 HOURS PRN
Status: DISCONTINUED | OUTPATIENT
Start: 2024-01-27 | End: 2024-01-27

## 2024-01-27 RX ORDER — METOCLOPRAMIDE HYDROCHLORIDE 5 MG/ML
10 INJECTION INTRAMUSCULAR; INTRAVENOUS EVERY 6 HOURS PRN
Status: DISCONTINUED | OUTPATIENT
Start: 2024-01-27 | End: 2024-01-27

## 2024-01-27 RX ORDER — HYDROMORPHONE HYDROCHLORIDE 1 MG/ML
1 INJECTION, SOLUTION INTRAMUSCULAR; INTRAVENOUS; SUBCUTANEOUS ONCE
Status: COMPLETED | OUTPATIENT
Start: 2024-01-27 | End: 2024-01-27

## 2024-01-27 RX ORDER — CODEINE SULFATE 15 MG/1
15 TABLET ORAL EVERY 6 HOURS PRN
Qty: 15 TABLET | Refills: 0 | Status: SHIPPED | OUTPATIENT
Start: 2024-01-27

## 2024-01-27 RX ORDER — METOCLOPRAMIDE 5 MG/1
5 TABLET ORAL 3 TIMES DAILY PRN
Qty: 30 TABLET | Refills: 0 | Status: SHIPPED | OUTPATIENT
Start: 2024-01-27

## 2024-01-27 NOTE — PROGRESS NOTES
History of Presenting Illness:  Patient seen and examined.  Patient still complains of mild frontal headache. Denies diplopia, dysarthria, dysphagia, new weakness, numbness, paresthesias.  No seizure-like activity or confusion.    Vitals:   Vitals:    01/27/24 1110   BP: 133/83   Pulse: 106   Resp: 20   Temp:       Examination:    Awake , alert, non-dysarthric, expressive an receptive language normal.   Pupils round and reactive to light, extra ocular movement normal, no facial weakness, hearing normal.  Motor strength and reflexes symmetrical.  Finger to Nose testing normal.     Investigations:    MRV HEAD (KLO=69863)    Result Date: 1/26/2024  CONCLUSION:  No significant intracranial venous thrombosis identified.   LOCATION:  [facility]   Dictated by (CST): Martell Ng MD on 1/26/2024 at 10:09 PM     Finalized by (CST): Martell Ng MD on 1/26/2024 at 10:10 PM       MRI BRAIN (CPT=70551)    Result Date: 1/26/2024  CONCLUSION:  No acute intracranial abnormality identified.   LOCATION:  [facility]   Dictated by (CST): Martell Ng MD on 1/26/2024 at 9:59 PM     Finalized by (CST): Martell Ng MD on 1/26/2024 at 10:00 PM          No results found for: \"A1C\"     Lab Results   Component Value Date    LDL 93 04/04/2022    HDL 79 04/04/2022    TRIG 94.00 04/04/2022        Recent Labs   Lab 01/24/24  1440 01/26/24  0653 01/27/24  0909   RBC 3.58* 3.62* 3.17*   HGB 11.4* 11.8* 10.1*   HCT 33.5* 34.0* 30.7*   MCV 93.6 93.9 96.8   MCH 31.8 32.6 31.9   MCHC 34.0 34.7 32.9   RDW 13.2 13.3 13.5   NEPRELIM 8.59* 13.23* 7.15   WBC 11.0 15.7* 10.8   .0 331.0 273.0       Recent Labs   Lab 01/24/24  1440 01/26/24  0653 01/27/24  0909   GLU 97 102* 83   BUN 7* 9 9   CREATSERUM 0.52* 0.50* 0.49*   EGFRCR 126 127 128   CA 9.0 8.5 8.3*    140 140   K 3.7 3.6 3.9    112 109   CO2 26.0 21.0 29.0       Impression/MDM.    Persistent headache.  Patient's 32 weeks pregnant.  Also has history of migraines.  Initial  workup including MRI of the brain as well as MRV did not show any acute abnormality.  Discussed situation with patient in detail.  Discussed case on the phone with patient's obstetrician.  Advised considering codeine if it is okay with the obstetrician.  Encephalopathy.  Patient baseline.  EEG did not show any abnormality.  History of migraines without aura, not intractable, without status migrainosus.  Patient's current symptoms are not consistent with patient's usual migraine headaches.  No further neurological intervention at this time.

## 2024-01-27 NOTE — DISCHARGE INSTRUCTIONS
Discharge Instructions    Diet: regular  Activity: Normal activity         General Instructions    Call your OB doctor if: Fluid leaking from your vagina;Uterine contractions increasing in intensity and frequency;Vaginal bleeding;Vaginal or rectal pressure;Decrease in fetal movement;Temperature greater than 100F

## 2024-01-27 NOTE — PROCEDURES
.  EEG REPORT;    Reason for Examination: Encephalopathy    Technical Summary:   18 Channels of EEG and 1 Channel of EKG was performed utilizing internation 10/20 method.        Background Activity:   The background activity consisted of 9-10 Hz waveforms, reactive to eye opening/ external stimulation.    Activation:    Hyperventilation:   Not Performed.    Photic Stimulation:  Driving response seen.No       Sleep:  Stage I sleep seen.     Impression:  This is a Normal EEG. No focal, lateralized or generalized epileptiform activity seen.       Anupam Das MD  [facility].

## 2024-01-27 NOTE — PROGRESS NOTES
Pt back from MRI ~2200 in stable condition. EFM placed at 2211. Pt requesting pain medicine for HA, rating pain 6/10; Fioricet due at 2300 but pt does not want to take that d/t caffeine & difficulty with sleeping. Pt requesting an alternative medication.

## 2024-01-27 NOTE — PLAN OF CARE
Problem: Patient/Family Goals  Goal: Patient/Family Long Term Goal  Description: Patient's Long Term Goal: maintain pregnancy to fullterm gestation    Interventions:    - See additional Care Plan goals for specific interventions  Outcome: Progressing  Goal: Patient/Family Short Term Goal  Description: Patient's Short Term Goal: effective pain and anxiety management    Interventions:   -   - See additional Care Plan goals for specific interventions  Outcome: Progressing     Problem: ANTEPARTUM/LABOR and DELIVERY  Goal: Maintain pregnancy as long as maternal and/or fetal condition is stable  Description: INTERVENTIONS:  - Maternal surveillance  - Fetal surveillance  - Monitor uterine activity  - Medications as ordered  - Bedrest  Outcome: Progressing  Goal: Anxiety is at manageable level  Description: INTERVENTIONS:  - [place] patient to unit/surroundings  - Establish a trusting relationship with patient  - Discuss possible complications or alterations in birth plan  - Explain treatment plan  - Explain testing/procedures prior to initiation  - Encourage participation in care  - Encourage verbalization of concerns/fears  - Identify coping mechanisms  - Administer/offer alternative therapies (Aroma therapy, distraction, guided imagery, massage, music therapy, therapeutic touch)  - Manage patient's environment (Avoid overstimulation of patient)  Outcome: Progressing  Goal: Demonstrates ability to cope with hospitalization/illness  Description: INTERVENTIONS:  - Encourage verbalization of feelings/concerns/expectations  - Provide quiet environment  - Assist patient to identify own strengths and abilities  - Encourage patient to set small goals for self  - Encourage participation in diversional activity  - Reinforce positive adaptation of new coping behaviors  - Include patient/family/caregiver in decisions  Outcome: Progressing     Problem: GASTROINTESTINAL - ADULT  Goal: Minimal or absence of nausea and  vomiting  Description: INTERVENTIONS:  - Maintain adequate hydration with IV or PO as ordered and tolerated  - Nasogastric tube to low intermittent suction as ordered  - Evaluate effectiveness of ordered antiemetic medications  - Provide nonpharmacologic comfort measures as appropriate  - Advance diet as tolerated, if ordered  - Obtain nutritional consult as needed  - Evaluate fluid balance  Outcome: Progressing  Goal: Maintains or returns to baseline bowel function  Description: INTERVENTIONS:  - Assess bowel function  - Maintain adequate hydration with IV or PO as ordered and tolerated  - Evaluate effectiveness of GI medications  - Encourage mobilization and activity  - Obtain nutritional consult as needed  - Establish a toileting routine/schedule  - Consider collaborating with pharmacy to review patient's medication profile  Outcome: Progressing  Goal: Maintains adequate nutritional intake (undernourished)  Description: INTERVENTIONS:  - Monitor percentage of each meal consumed  - Identify factors contributing to decreased intake, treat as appropriate  - Assist with meals as needed  - Monitor I&O, WT and lab values  - Obtain nutritional consult as needed  - Optimize oral hygiene and moisture  - Encourage food from home; allow for food preferences  - Enhance eating environment  Outcome: Progressing  Goal: Achieves appropriate nutritional intake (bariatric)  Description: INTERVENTIONS:  - Monitor for over-consumption  - Identify factors contributing to increased intake, treat as appropriate  - Monitor I&O, WT and lab values  - Obtain nutritional consult as needed  - Evaluate psychosocial factors contributing to over-consumption  Outcome: Progressing

## 2024-01-27 NOTE — PROGRESS NOTES
This RN notified by MRI dept that pt transport will be arriving for pt. Will cont EFM following test.

## 2024-01-27 NOTE — PROGRESS NOTES
[facility]  Antepartum    Martina Centeno Patient Status:  Inpatient    1990 MRN [id number]   Location [facility] LABOR & DELIVERY Attending Maritza Cohen MD   Hosp Day # 2 PCP Yemi Bryan MD     SUBJECTIVE:    Interval History: Had 1 episode of n/v this am with her migraine rated 8/10. Has since ate without n/v. No RUQ pain, feeling movement. No bleeding or contractions    OBJECTIVE:    Vital signs in last 24 hours:  Temp:  [98.2 °F (36.8 °C)-98.4 °F (36.9 °C)] 98.2 °F (36.8 °C)  Pulse:  [80-97] 96  Resp:  [16-20] 16  BP: (100-119)/(50-90) 119/90  SpO2:  [98 %-99 %] 98 %  Gen: AAOx3, NAD. Resting in bed  Abd: soft NTND, gravid  Ext: no edema  Fetal Surveillance: reactive    Lab data:    Recent Results (from the past 72 hour(s))   Comp Metabolic Panel (14)    Collection Time: 24  2:40 PM   Result Value Ref Range    Glucose 97 70 - 99 mg/dL    Sodium 138 136 - 145 mmol/L    Potassium 3.7 3.5 - 5.1 mmol/L    Chloride 108 98 - 112 mmol/L    CO2 26.0 21.0 - 32.0 mmol/L    Anion Gap 4 0 - 18 mmol/L    BUN 7 (L) 9 - 23 mg/dL    Creatinine 0.52 (L) 0.55 - 1.02 mg/dL    Calcium, Total 9.0 8.5 - 10.1 mg/dL    Calculated Osmolality 284 275 - 295 mOsm/kg    eGFR-Cr 126 >=60 mL/min/1.73m2    AST 4 (L) 15 - 37 U/L    ALT      Alkaline Phosphatase 93 37 - 98 U/L    Bilirubin, Total 0.2 0.1 - 2.0 mg/dL    Total Protein 6.6 6.4 - 8.2 g/dL    Albumin 3.0 (L) 3.4 - 5.0 g/dL    Globulin  3.6 2.8 - 4.4 g/dL    A/G Ratio 0.8 (L) 1.0 - 2.0   Uric Acid    Collection Time: 24  2:40 PM   Result Value Ref Range    Uric Acid 3.0 2.6 - 6.0 mg/dL   CBC W/ DIFFERENTIAL    Collection Time: 24  2:40 PM   Result Value Ref Range    WBC 11.0 4.0 - 11.0 x10(3) uL    RBC 3.58 (L) 3.80 - 5.30 x10(6)uL    HGB 11.4 (L) 12.0 - 16.0 g/dL    HCT 33.5 (L) 35.0 - 48.0 %    .0 150.0 - 450.0 10(3)uL    MCV 93.6 80.0 - 100.0 fL    MCH 31.8 26.0 - 34.0 pg    MCHC 34.0 31.0 - 37.0 g/dL    RDW 13.2 %    Neutrophil  Absolute Prelim 8.59 (H) 1.50 - 7.70 x10 (3) uL    Neutrophil Absolute 8.59 (H) 1.50 - 7.70 x10(3) uL    Lymphocyte Absolute 1.67 1.00 - 4.00 x10(3) uL    Monocyte Absolute 0.57 0.10 - 1.00 x10(3) uL    Eosinophil Absolute 0.12 0.00 - 0.70 x10(3) uL    Basophil Absolute 0.02 0.00 - 0.20 x10(3) uL    Immature Granulocyte Absolute 0.04 0.00 - 1.00 x10(3) uL    Neutrophil % 77.9 %    Lymphocyte % 15.2 %    Monocyte % 5.2 %    Eosinophil % 1.1 %    Basophil % 0.2 %    Immature Granulocyte % 0.4 %   RAPID HIV    Collection Time: 01/24/24  2:40 PM   Result Value Ref Range    Rapid HIV Ag/Ab Combo Nonreactive Nonreactive   Urinalysis, Routine    Collection Time: 01/24/24  4:24 PM   Result Value Ref Range    Urine Color Light-Yellow Yellow    Clarity Urine Clear Clear    Spec Gravity 1.010 1.005 - 1.030    Glucose Urine Normal Normal mg/dL    Bilirubin Urine Negative Negative    Ketones Urine Negative Negative mg/dL    Blood Urine Negative Negative    pH Urine 7.0 5.0 - 8.0    Protein Urine Negative Negative mg/dL    Urobilinogen Urine Normal Normal mg/dL    Nitrite Urine Negative Negative    Leukocyte Esterase Urine Negative Negative    Microscopic Microscopic not indicated    POCT Glucose    Collection Time: 01/24/24  5:29 PM   Result Value Ref Range    POC Glucose 135 (H) 70 - 99 mg/dL   POCT Glucose    Collection Time: 01/24/24  9:39 PM   Result Value Ref Range    POC Glucose 154 (H) 70 - 99 mg/dL   POCT Glucose    Collection Time: 01/25/24  5:34 AM   Result Value Ref Range    POC Glucose 120 (H) 70 - 99 mg/dL   POCT Glucose    Collection Time: 01/25/24 12:36 PM   Result Value Ref Range    POC Glucose 134 (H) 70 - 99 mg/dL   POCT Glucose    Collection Time: 01/25/24  5:44 PM   Result Value Ref Range    POC Glucose 138 (H) 70 - 99 mg/dL   POCT Glucose    Collection Time: 01/25/24  9:23 PM   Result Value Ref Range    POC Glucose 168 (H) 70 - 99 mg/dL   Comp Metabolic Panel (14)    Collection Time: 01/26/24  6:53 AM    Result Value Ref Range    Glucose 102 (H) 70 - 99 mg/dL    Sodium 140 136 - 145 mmol/L    Potassium 3.6 3.5 - 5.1 mmol/L    Chloride 112 98 - 112 mmol/L    CO2 21.0 21.0 - 32.0 mmol/L    Anion Gap 7 0 - 18 mmol/L    BUN 9 9 - 23 mg/dL    Creatinine 0.50 (L) 0.55 - 1.02 mg/dL    Calcium, Total 8.5 8.5 - 10.1 mg/dL    Calculated Osmolality 289 275 - 295 mOsm/kg    eGFR-Cr 127 >=60 mL/min/1.73m2    AST 15 15 - 37 U/L    ALT      Alkaline Phosphatase 96 37 - 98 U/L    Bilirubin, Total 0.2 0.1 - 2.0 mg/dL    Total Protein 6.9 6.4 - 8.2 g/dL    Albumin 3.2 (L) 3.4 - 5.0 g/dL    Globulin  3.7 2.8 - 4.4 g/dL    A/G Ratio 0.9 (L) 1.0 - 2.0   CBC W/ DIFFERENTIAL    Collection Time: 01/26/24  6:53 AM   Result Value Ref Range    WBC 15.7 (H) 4.0 - 11.0 x10(3) uL    RBC 3.62 (L) 3.80 - 5.30 x10(6)uL    HGB 11.8 (L) 12.0 - 16.0 g/dL    HCT 34.0 (L) 35.0 - 48.0 %    .0 150.0 - 450.0 10(3)uL    MCV 93.9 80.0 - 100.0 fL    MCH 32.6 26.0 - 34.0 pg    MCHC 34.7 31.0 - 37.0 g/dL    RDW 13.3 %    Neutrophil Absolute Prelim 13.23 (H) 1.50 - 7.70 x10 (3) uL    Neutrophil Absolute 13.23 (H) 1.50 - 7.70 x10(3) uL    Lymphocyte Absolute 1.54 1.00 - 4.00 x10(3) uL    Monocyte Absolute 0.80 0.10 - 1.00 x10(3) uL    Eosinophil Absolute 0.00 0.00 - 0.70 x10(3) uL    Basophil Absolute 0.02 0.00 - 0.20 x10(3) uL    Immature Granulocyte Absolute 0.09 0.00 - 1.00 x10(3) uL    Neutrophil % 84.4 %    Lymphocyte % 9.8 %    Monocyte % 5.1 %    Eosinophil % 0.0 %    Basophil % 0.1 %    Immature Granulocyte % 0.6 %   POCT Glucose    Collection Time: 01/26/24  6:59 AM   Result Value Ref Range    POC Glucose 110 (H) 70 - 99 mg/dL   POCT Glucose    Collection Time: 01/26/24 12:38 PM   Result Value Ref Range    POC Glucose 134 (H) 70 - 99 mg/dL   POCT Glucose    Collection Time: 01/26/24  5:17 PM   Result Value Ref Range    POC Glucose 124 (H) 70 - 99 mg/dL   POCT Glucose    Collection Time: 01/27/24  6:48 AM   Result Value Ref Range    POC Glucose  81 70 - 99 mg/dL   ABORH (Blood Type)    Collection Time: 01/27/24  9:08 AM   Result Value Ref Range    ABO BLOOD TYPE B     RH BLOOD TYPE Positive    Antibody Screen    Collection Time: 01/27/24  9:08 AM   Result Value Ref Range    Antibody Screen Negative    Comp Metabolic Panel (14)    Collection Time: 01/27/24  9:09 AM   Result Value Ref Range    Glucose 83 70 - 99 mg/dL    Sodium 140 136 - 145 mmol/L    Potassium 3.9 3.5 - 5.1 mmol/L    Chloride 109 98 - 112 mmol/L    CO2 29.0 21.0 - 32.0 mmol/L    Anion Gap 2 0 - 18 mmol/L    BUN 9 9 - 23 mg/dL    Creatinine 0.49 (L) 0.55 - 1.02 mg/dL    Calcium, Total 8.3 (L) 8.5 - 10.1 mg/dL    Calculated Osmolality 288 275 - 295 mOsm/kg    eGFR-Cr 128 >=60 mL/min/1.73m2    AST 7 (L) 15 - 37 U/L    ALT      Alkaline Phosphatase 76 37 - 98 U/L    Bilirubin, Total 0.2 0.1 - 2.0 mg/dL    Total Protein 5.8 (L) 6.4 - 8.2 g/dL    Albumin 2.6 (L) 3.4 - 5.0 g/dL    Globulin  3.2 2.8 - 4.4 g/dL    A/G Ratio 0.8 (L) 1.0 - 2.0   CBC W/ DIFFERENTIAL    Collection Time: 01/27/24  9:09 AM   Result Value Ref Range    WBC 10.8 4.0 - 11.0 x10(3) uL    RBC 3.17 (L) 3.80 - 5.30 x10(6)uL    HGB 10.1 (L) 12.0 - 16.0 g/dL    HCT 30.7 (L) 35.0 - 48.0 %    .0 150.0 - 450.0 10(3)uL    MCV 96.8 80.0 - 100.0 fL    MCH 31.9 26.0 - 34.0 pg    MCHC 32.9 31.0 - 37.0 g/dL    RDW 13.5 %    Neutrophil Absolute Prelim 7.15 1.50 - 7.70 x10 (3) uL    Neutrophil Absolute 7.15 1.50 - 7.70 x10(3) uL    Lymphocyte Absolute 2.56 1.00 - 4.00 x10(3) uL    Monocyte Absolute 0.90 0.10 - 1.00 x10(3) uL    Eosinophil Absolute 0.05 0.00 - 0.70 x10(3) uL    Basophil Absolute 0.02 0.00 - 0.20 x10(3) uL    Immature Granulocyte Absolute 0.10 0.00 - 1.00 x10(3) uL    Neutrophil % 66.4 %    Lymphocyte % 23.7 %    Monocyte % 8.3 %    Eosinophil % 0.5 %    Basophil % 0.2 %    Immature Granulocyte % 0.9 %     ASSESSMENT/PLAN:    IUP @ 32w4d admitted for headache. Pre-eclampsia vs atypical migraine with gestational  proteinuria  -- earlier in the week had some mildly elevated pressures but currently normal blood pressures  -- labs normal, ALT pending  -- has been on fioricet and tylenol for pain, continue as needed  -- headache cocktail ordered this am with IVF bolus, reglan benadryl  -- neurology on consult and appreciate further recommendations on migraine evaluation/treatment  -- MFM on consult  2. FGR with lagging AC -- normal dopplers. Will repeat growth in 3 weeks   -- s/p beta 1/24-25    Continue inpatient management    Dodie Gilliam MD  [facility]  Contact via [employer] or cell

## 2024-01-27 NOTE — PLAN OF CARE
Problem: Patient/Family Goals  Goal: Patient/Family Long Term Goal  Description: Patient's Long Term Goal: maintain pregnancy to fullterm gestation    Interventions:    - See additional Care Plan goals for specific interventions  Outcome: Completed  Goal: Patient/Family Short Term Goal  Description: Patient's Short Term Goal: effective pain and anxiety management    Interventions:   -   - See additional Care Plan goals for specific interventions  Outcome: Completed     Problem: ANTEPARTUM/LABOR and DELIVERY  Goal: Maintain pregnancy as long as maternal and/or fetal condition is stable  Description: INTERVENTIONS:  - Maternal surveillance  - Fetal surveillance  - Monitor uterine activity  - Medications as ordered  - Bedrest  Outcome: Completed  Goal: Anxiety is at manageable level  Description: INTERVENTIONS:  - [place] patient to unit/surroundings  - Establish a trusting relationship with patient  - Discuss possible complications or alterations in birth plan  - Explain treatment plan  - Explain testing/procedures prior to initiation  - Encourage participation in care  - Encourage verbalization of concerns/fears  - Identify coping mechanisms  - Administer/offer alternative therapies (Aroma therapy, distraction, guided imagery, massage, music therapy, therapeutic touch)  - Manage patient's environment (Avoid overstimulation of patient)  Outcome: Completed  Goal: Demonstrates ability to cope with hospitalization/illness  Description: INTERVENTIONS:  - Encourage verbalization of feelings/concerns/expectations  - Provide quiet environment  - Assist patient to identify own strengths and abilities  - Encourage patient to set small goals for self  - Encourage participation in diversional activity  - Reinforce positive adaptation of new coping behaviors  - Include patient/family/caregiver in decisions  Outcome: Completed     Problem: GASTROINTESTINAL - ADULT  Goal: Minimal or absence of nausea and vomiting  Description:  INTERVENTIONS:  - Maintain adequate hydration with IV or PO as ordered and tolerated  - Nasogastric tube to low intermittent suction as ordered  - Evaluate effectiveness of ordered antiemetic medications  - Provide nonpharmacologic comfort measures as appropriate  - Advance diet as tolerated, if ordered  - Obtain nutritional consult as needed  - Evaluate fluid balance  Outcome: Completed  Goal: Maintains or returns to baseline bowel function  Description: INTERVENTIONS:  - Assess bowel function  - Maintain adequate hydration with IV or PO as ordered and tolerated  - Evaluate effectiveness of GI medications  - Encourage mobilization and activity  - Obtain nutritional consult as needed  - Establish a toileting routine/schedule  - Consider collaborating with pharmacy to review patient's medication profile  Outcome: Completed  Goal: Maintains adequate nutritional intake (undernourished)  Description: INTERVENTIONS:  - Monitor percentage of each meal consumed  - Identify factors contributing to decreased intake, treat as appropriate  - Assist with meals as needed  - Monitor I&O, WT and lab values  - Obtain nutritional consult as needed  - Optimize oral hygiene and moisture  - Encourage food from home; allow for food preferences  - Enhance eating environment  Outcome: Completed  Goal: Achieves appropriate nutritional intake (bariatric)  Description: INTERVENTIONS:  - Monitor for over-consumption  - Identify factors contributing to increased intake, treat as appropriate  - Monitor I&O, WT and lab values  - Obtain nutritional consult as needed  - Evaluate psychosocial factors contributing to over-consumption  Outcome: Completed

## 2024-01-27 NOTE — PROGRESS NOTES
Pt given oxycodone per order 30mins ago for pain 7/10. No change at this time; pt requests to try sleeping.

## 2024-01-27 NOTE — PROGRESS NOTES
Discharge instructions given by . Pt verbalized understanding.Pt assisted out to car via wheelchair in stable condition.

## 2024-01-28 NOTE — DISCHARGE SUMMARY
[facility]    Discharge Summary    Martina Centeno Patient Status:  Inpatient    1990 MRN [id number]   Location [facility] LABOR & DELIVERY Attending No att. providers found   Hosp Day # 2 PCP Yemi Bryan MD     Date of Admission: 2024 Disposition: Home or Self Care     Date of Discharge: 2024  3:04 PM    Admitting Diagnosis: pregnancy  Epigastric pain during pregnancy, antepartum  Pregnancy    Discharge Diagnosis:   Patient Active Problem List   Diagnosis    Migraine without status migrainosus, not intractable, unspecified migraine type    Vitamin D deficiency    Breast atrophy    Epigastric pain during pregnancy, antepartum    Pregnancy    Acute intractable headache       Reason for Admission: headache, abdominal pain, fetal monitoring, blood pressure monitoring    Physical Exam:   Vitals:    24 1110   BP: 133/83   Pulse: 106   Resp: 20   Temp:        General: No acute distress  Pulm: nonlabored breathing  Cardiac: regular rate  Abd: soft, nontender, fundus firm below umbilicus  Ext: nontender lower extremities       History of Present Illness:   Martina Centeno is a 33 year old  who presents with complaint of headache and epigastric pain. Seen in triage  for epigastric pain. Sharp right abdominal pain. Got labs which were all normal except elevated PCR at 0.3. She got IV pepcid and zofran.   Pressures on  were 120/76, 135/91, 138/97, 130/90 (all of the elevated diastolics were 20 min apart). Pressure in office  was 118/78.  She was sent home with 24 hour urine protein which resulted 342.  Headache started today and feels different than usual migraines--more on the top of her head and in her eyes. No other changes in eating, hydration, stress. She has vomited a couple times today.     Prenatal issues:  1) Migraines, sees neurology  2) H/o 35 week PTL/PTD, on vaginal progesterone  3) S<D EFW 48%ile on   4) GDM A1  5) Anxiety and depression on sertraline  6)  new onset FGR    Hospital Course:   Patient was admitted to antepartum unit. She received course of betamethasone for lung maturation in case delivery found to be necessary. MFM and neurology were consulted. Blood pressures remained normotensive and preeclamptic labs were normal. FHT was reassuring. Ultrasound showed fetal growth restriction with normal doppler. Neuro workup was negative and headache improved. Patient was found stable for discharge home with close follow-up. BPP/UA dopplers weekly with MFM, growth q 3 weeks, weekly NST with OB office, weekly labs    Consultations: neurology and MFM    Procedures: none    Complications: none    Discharge Condition: Stable         Discharge Medications:      Discharge Medications        START taking these medications        Instructions Prescription details   Codeine Sulfate 15 MG Tabs      Take 1 tablet (15 mg total) by mouth every 6 (six) hours as needed for Pain.   Quantity: 15 tablet  Refills: 0     metoclopramide 5 MG Tabs  Commonly known as: Reglan      Take 1 tablet (5 mg total) by mouth 3 (three) times daily as needed (headache).   Quantity: 30 tablet  Refills: 0            CONTINUE taking these medications        Instructions Prescription details   prenatal vitamin with DHA 27-0.8-228 MG Caps      Take 1 capsule by mouth daily.   Refills: 0     progesterone 200 MG Caps  Commonly known as: Prometrium      Place 1 capsule (200 mg total) vaginally nightly.   Refills: 0     sertraline 50 MG Tabs  Commonly known as: Zoloft      Take 1 tablet (50 mg total) by mouth daily.   Refills: 0               Where to Get Your Medications        These medications were sent to [id number] - [place], [place] - [address] [phone], [phone]  [address] [address]      Phone: [phone]   Codeine Sulfate 15 MG Tabs  metoclopramide 5 MG Tabs         Follow up Visits: Follow-up with MFM and OB weekly     Dodie Gilliam MD  1/27/2024  6:23 PM

## 2024-02-02 ENCOUNTER — ULTRASOUND ENCOUNTER (OUTPATIENT)
Dept: PERINATAL CARE | Facility: HOSPITAL | Age: 34
End: 2024-02-02
Attending: OBSTETRICS & GYNECOLOGY
Payer: COMMERCIAL

## 2024-02-02 VITALS
HEIGHT: 66 IN | DIASTOLIC BLOOD PRESSURE: 78 MMHG | WEIGHT: 132 LBS | SYSTOLIC BLOOD PRESSURE: 120 MMHG | HEART RATE: 107 BPM | BODY MASS INDEX: 21.21 KG/M2

## 2024-02-02 DIAGNOSIS — O36.5930 POOR FETAL GROWTH AFFECTING MANAGEMENT OF MOTHER IN THIRD TRIMESTER, SINGLE OR UNSPECIFIED FETUS: Primary | ICD-10-CM

## 2024-02-02 DIAGNOSIS — O14.93 PRE-ECLAMPSIA, THIRD TRIMESTER: ICD-10-CM

## 2024-02-02 DIAGNOSIS — O36.5930 POOR FETAL GROWTH AFFECTING MANAGEMENT OF MOTHER IN THIRD TRIMESTER, SINGLE OR UNSPECIFIED FETUS: ICD-10-CM

## 2024-02-02 PROCEDURE — 76820 UMBILICAL ARTERY ECHO: CPT

## 2024-02-02 PROCEDURE — 76819 FETAL BIOPHYS PROFIL W/O NST: CPT | Performed by: OBSTETRICS & GYNECOLOGY

## 2024-02-02 RX ORDER — ACETAMINOPHEN 160 MG
2000 TABLET,DISINTEGRATING ORAL DAILY
COMMUNITY

## 2024-02-02 NOTE — PROGRESS NOTES
Outpatient Maternal-Fetal Medicine Consultation    Dear Dr. Moses,    Thank you for requesting ultrasound evaluation and maternal fetal medicine consultation on your patient Martina Centeno.  As you are aware she is a 33 year old female with a Masters pregnancy at 33w3d.  A maternal-fetal medicine consultation was requested secondary to fetal growth restriction, preeclampsia without severe features.  History of migraines which have increased due to inability to maintain her normal migraine medication.  Her prenatal records and labs were reviewed.    HISTORY  OB History    Para Term  AB Living   3 2 1 1 0 2   SAB IAB Ectopic Multiple Live Births   0 0 0 0 2     # 1 - Date: 18, Sex: Male, Weight: 6 lb 5.2 oz (2.87 kg), GA: 35w4d, Delivery: Normal spontaneous vaginal delivery, Apgar1: 9, Apgar5: 9, Living: Living, Birth Comments: Mitch Trinidad MD    # 2 - Date: 19, Sex: Male, Weight: 6 lb 0.3 oz (2.73 kg), GA: 38w6d, Delivery: Normal spontaneous vaginal delivery, Apgar1: 8, Apgar5: 9, Living: Living, Birth Comments: None    # 3 - Date: None, Sex: None, Weight: None, GA: None, Delivery: None, Apgar1: None, Apgar5: None, Living: None, Birth Comments: None    Past Medical History  The patient  has a past medical history of Abdominal pain, Acute cholecystitis (1/10/2019), Anemia, Anxiety (), Cervical dysplasia (), Mental disorder, Migraines, Nausea & vomiting, Pancreas divisum, and  labor.    She has no past medical history of Difficult intubation or Malignant hyperthermia.    Past Surgical History  The patient  has a past surgical history that includes upper gi endoscopy,biopsy (19= Bx: Normal); cholecystectomy (2019); and other surgical history (2019).    Family History  The patient She indicated that her mother is alive. She indicated that her father is alive. She indicated that her sister is alive. She indicated that her maternal grandmother is . She  indicated that both of her sons are alive. She indicated that the status of her neg is unknown.      Medications:   Current Outpatient Medications:     MAGNESIUM OR, Take 400 mg by mouth daily., Disp: , Rfl:     cholecalciferol 50 MCG (2000 UT) Oral Cap, Take 1 capsule (2,000 Units total) by mouth daily., Disp: , Rfl:     metoclopramide 5 MG Oral Tab, Take 1 tablet (5 mg total) by mouth 3 (three) times daily as needed (headache)., Disp: 30 tablet, Rfl: 0    prenatal vitamin with DHA 27-0.8-228 MG Oral Cap, Take 1 capsule by mouth daily., Disp: , Rfl:     progesterone 200 MG Oral Cap, Place 1 capsule (200 mg total) vaginally nightly., Disp: , Rfl:     sertraline 50 MG Oral Tab, Take 1 tablet (50 mg total) by mouth daily., Disp: , Rfl:     Codeine Sulfate 15 MG Oral Tab, Take 1 tablet (15 mg total) by mouth every 6 (six) hours as needed for Pain., Disp: 15 tablet, Rfl: 0  Allergies: No Known Allergies      PHYSICAL EXAMINATION:  /78 (BP Location: Right arm, Patient Position: Sitting, Cuff Size: adult)   Pulse 107   Ht 5' 6\" (1.676 m)   Wt 132 lb (59.9 kg)   BMI 21.31 kg/m²   General: alert and oriented,no acute distress  Abdomen: gravid, soft, non-tender  Extremities: non-tender, no edema      OBSTETRIC ULTRASOUND  The patient had a follow-up, BPP and umbilical artery Doppler ultrasound today which I interpreted the results and reviewed them with the patient.    Ultrasound Findings:  Single IUP in cephalic presentation.    Placenta is anterior.   Cardiac activity is present at 137 bpm  MVP is 6.2 cm . JULIET 16.4 cm  BPP is 8/8.   Umbilical Artery Doppler is 3.35.     Normal UA Doppler.     See imaging tab for the complete US report.    DISCUSSION  During her visit we discussed and reviewed the following issues:  Preeclampsia -discussed previously and reviewed.  See inpatient consultation from 12/25/2024 for detailed review    Migraines -   Her headache is felt to be related to her untreated migraines.   Fioricet is helping but does not resolve the headache completely.  There is been no change.    FETAL GROWTH RESTRICTION    Diagnosed 2024 and discussed.  See note from that day for detailed review.  The overall growth was within range but the abdominal circumference was less than the 10th percentile.  There is normal umbilical artery Doppler  Category  FGR (AC < 10%) with NORMAL UA Dopplers    Subsequent Management  Weekly NST's  Repeat UA Dopplers & AFV in 1-2 weeks; if normal repeat Dopplers with each (q 3 week) growth ultrasound  Repeat growth ultrasound with UA Dopplers q 3 weeks  Delivery advised at 38-39 weeks      IMPRESSION:  IUP at 33w3d; cephalic  Fetal growth restriction -normal umbilical artery Doppler  Reassuring  testing  Mild preeclampsia without severe features  Maternal migraines, suboptimally treated due to medication limitations in pregnancy    RECOMMENDATIONS:  Continue care with Dr. Moses  Follow-up growth BPP and Doppler every 3 weeks due to fetal growth restriction (next growth ultrasound due in 2 weeks)  Twice weekly  testing -weekly NST with OB office and weekly BPP with MFM office  Delivery is advised at 37 weeks unless an indication for earlier delivery arises    Total time spent 40 minutes this calendar day which includes preparing to see the patient including chart review, obtaining and/or reviewing additional medical history, performing a physical exam and evaluation, documenting clinical information in the electronic medical record, independently interpreting results, counseling the patient, communicating results to the patient/family/caregiver and coordinating care.     Case discussed with patient who demonstrated understanding and agreement with plan.     Thank you for allowing me to participate in the care of this patient.  Please feel free to contact me with any questions.    Judy Schmid MD  Maternal-Fetal Medicine       Note to patient and family:  The  21st Century Cures Act makes medical notes available to patients in the interest of transparency.  However, please be advised that this is a medical document.  It is intended as a peer to peer communication.  It is written in medical language and may contain abbreviations or verbiage that are technical and unfamiliar.  It may appear blunt or direct.  Medical documents are intended to carry relevant information, facts as evident, and the clinical opinion of the practitioner.

## 2024-02-02 NOTE — PROGRESS NOTES
Pt here for UA Dopplers/BPP  +fm noted per patient  Pt c/o H/A x 9 days (pt was in L&D last week 1/25).  Pt GDM--diet controlled--OB managing

## 2024-02-03 NOTE — PROGRESS NOTES
Outpatient Maternal-Fetal Medicine Follow-Up    Dear Dr. Trinidad    Thank you for requesting ultrasound evaluation and maternal fetal medicine consultation on your patient Martina Centeno.  As you are aware she is a 33 year old female  with a mcneal pregnancy and an Estimated Date of Delivery: 3/19/24.  She returned to maternal-fetal medicine today for a follow-up visit.  Her history was reviewed from her prior visit and there were no interval changes.    Antepartum Risk Factors  Fetal growth restriction  Mild preeclampsia without severe features  Maternal migraines, suboptimally treated due to medication limitations in pregnancy    PHYSICAL EXAMINATION:  /79 (BP Location: Right arm, Patient Position: Sitting, Cuff Size: adult)   Pulse 92   Wt 131 lb (59.4 kg)   BMI 21.14 kg/m²   General: alert and oriented, no acute distress  Abdomen: gravid, soft, non-tender  Extremities: non-tender, no edema    OBSTETRIC ULTRASOUND    Ultrasound Findings:  Single IUP in cephalic presentation.    Placenta is anterior.   Cardiac activity is present at 120 bpm  MVP is 3.6 cm . JULIET 12.2 cm  BPP is 8/8.   Umbilical Artery Doppler is 3.35.     BPP 8/8    See PACS/Imaging Tab For Complete Ultrasound Report  I interpreted the results and reviewed them with the patient.    DISCUSSION  During her visit we discussed and reviewed the following issues:  PREECLAMPSIA    Patient Review   Admitted 24-24.     Preeclampsia Labs    24-hour 342 mg  Serum labs - 24  AST: 16  ALT 16  Creatinine: 0.38  Plt: 309    BP Review  Anti-Hypertensive medications:  No Medications  Home BP's: 110-120's/80-95's  Her blood pressure control is adequate.  Medical Regimen Recommendation: no change.    Discussion  See prior [facility] notes for a detailed review.    FETAL GROWTH RESTRICTION  FGR was diagnosed on prior ultrasound.  The patient returned for a follow-up assessment today.    DISCUSSION  See prior [facility] notes for a detailed  review.    Category  FGR (AC < 10%) with NORMAL UA Dopplers    Subsequent Management  See below    IMPRESSION:  IUP at 34w3d  Fetal growth restriction - BPP / , normal umbilical artery Doppler  Mild preeclampsia without severe features  Maternal migraines, suboptimally treated due to medication limitations in pregnancy     RECOMMENDATIONS:  Continue care with Dr. Trinidad  Follow-up growth BPP and Doppler every 3 weeks due to fetal growth restriction (next growth ultrasound due in 1 week)  Twice weekly  testing -weekly NST with OB office and weekly BPP with MFM office  Delivery is advised at 37 weeks unless an indication for earlier delivery arises -may need to consider earlier delivery if intractable headache ensues    Thank you for allowing me to participate in the care of your patient.  Please do not hesitate to contact me if additional questions or concerns arise.      Ld Block M.D.    30 minutes spent in review of records, patient consultation, documentation and coordination of care.  The relevant clinical matter(s) are summarized above.     Note to patient and family  The 21st Century Cures Act makes medical notes available to patients in the interest of transparency.  However, please be advised that this is a medical document.  It is intended as jtfd-hi-zvah communication.  It is written and medical language may contain abbreviations or verbiage that are technical and unfamiliar.  It may appear blunt or direct.  Medical documents are intended to carry relevant information, facts as evident, and the clinical opinion of the practitioner.

## 2024-02-06 ENCOUNTER — HOSPITAL ENCOUNTER (OUTPATIENT)
Facility: HOSPITAL | Age: 34
Discharge: HOME | End: 2024-02-06
Attending: OBSTETRICS & GYNECOLOGY | Admitting: OBSTETRICS & GYNECOLOGY
Payer: COMMERCIAL

## 2024-02-06 VITALS
HEIGHT: 66 IN | WEIGHT: 132 LBS | TEMPERATURE: 98 F | DIASTOLIC BLOOD PRESSURE: 77 MMHG | BODY MASS INDEX: 21.21 KG/M2 | SYSTOLIC BLOOD PRESSURE: 123 MMHG | HEART RATE: 74 BPM

## 2024-02-06 LAB
ALBUMIN SERPL-MCNC: 2.8 G/DL (ref 3.4–5)
ALBUMIN/GLOB SERPL: 0.8 {RATIO} (ref 1–2)
ALP LIVER SERPL-CCNC: 114 U/L
ALT SERPL-CCNC: 19 U/L
ANION GAP SERPL CALC-SCNC: 10 MMOL/L (ref 0–18)
AST SERPL-CCNC: 11 U/L (ref 15–37)
BASOPHILS # BLD AUTO: 0.02 X10(3) UL (ref 0–0.2)
BASOPHILS NFR BLD AUTO: 0.2 %
BILIRUB SERPL-MCNC: 0.1 MG/DL (ref 0.1–2)
BUN BLD-MCNC: 6 MG/DL (ref 9–23)
CALCIUM BLD-MCNC: 8.8 MG/DL (ref 8.5–10.1)
CHLORIDE SERPL-SCNC: 109 MMOL/L (ref 98–112)
CO2 SERPL-SCNC: 20 MMOL/L (ref 21–32)
CREAT BLD-MCNC: 0.43 MG/DL
EGFRCR SERPLBLD CKD-EPI 2021: 132 ML/MIN/1.73M2 (ref 60–?)
EOSINOPHIL # BLD AUTO: 0.06 X10(3) UL (ref 0–0.7)
EOSINOPHIL NFR BLD AUTO: 0.6 %
ERYTHROCYTE [DISTWIDTH] IN BLOOD BY AUTOMATED COUNT: 13.7 %
FASTING STATUS PATIENT QL REPORTED: NO
GLOBULIN PLAS-MCNC: 3.7 G/DL (ref 2.8–4.4)
GLUCOSE BLD-MCNC: 125 MG/DL (ref 70–99)
HCT VFR BLD AUTO: 34.7 %
HGB BLD-MCNC: 11.8 G/DL
IMM GRANULOCYTES # BLD AUTO: 0.04 X10(3) UL (ref 0–1)
IMM GRANULOCYTES NFR BLD: 0.4 %
LYMPHOCYTES # BLD AUTO: 1.72 X10(3) UL (ref 1–4)
LYMPHOCYTES NFR BLD AUTO: 16.4 %
MCH RBC QN AUTO: 32.2 PG (ref 26–34)
MCHC RBC AUTO-ENTMCNC: 34 G/DL (ref 31–37)
MCV RBC AUTO: 94.8 FL
MONOCYTES # BLD AUTO: 0.61 X10(3) UL (ref 0.1–1)
MONOCYTES NFR BLD AUTO: 5.8 %
NEUTROPHILS # BLD AUTO: 8.02 X10 (3) UL (ref 1.5–7.7)
NEUTROPHILS # BLD AUTO: 8.02 X10(3) UL (ref 1.5–7.7)
NEUTROPHILS NFR BLD AUTO: 76.6 %
OSMOLALITY SERPL CALC.SUM OF ELEC: 287 MOSM/KG (ref 275–295)
PLATELET # BLD AUTO: 288 10(3)UL (ref 150–450)
POTASSIUM SERPL-SCNC: 3.8 MMOL/L (ref 3.5–5.1)
PROT SERPL-MCNC: 6.5 G/DL (ref 6.4–8.2)
RBC # BLD AUTO: 3.66 X10(6)UL
SODIUM SERPL-SCNC: 139 MMOL/L (ref 136–145)
WBC # BLD AUTO: 10.5 X10(3) UL (ref 4–11)

## 2024-02-06 PROCEDURE — 36415 COLL VENOUS BLD VENIPUNCTURE: CPT

## 2024-02-06 PROCEDURE — 96361 HYDRATE IV INFUSION ADD-ON: CPT

## 2024-02-06 PROCEDURE — 59025 FETAL NON-STRESS TEST: CPT

## 2024-02-06 PROCEDURE — 80053 COMPREHEN METABOLIC PANEL: CPT | Performed by: OBSTETRICS & GYNECOLOGY

## 2024-02-06 PROCEDURE — 85025 COMPLETE CBC W/AUTO DIFF WBC: CPT | Performed by: OBSTETRICS & GYNECOLOGY

## 2024-02-06 PROCEDURE — 96374 THER/PROPH/DIAG INJ IV PUSH: CPT

## 2024-02-06 PROCEDURE — 99213 OFFICE O/P EST LOW 20 MIN: CPT

## 2024-02-06 RX ORDER — BUTALBITAL, ACETAMINOPHEN AND CAFFEINE 50; 325; 40 MG/1; MG/1; MG/1
1 TABLET ORAL EVERY 4 HOURS PRN
Status: DISCONTINUED | OUTPATIENT
Start: 2024-02-06 | End: 2024-02-06

## 2024-02-06 RX ORDER — LANCETS
1 EACH MISCELLANEOUS 4 TIMES DAILY
COMMUNITY
Start: 2023-12-19

## 2024-02-06 RX ORDER — BLOOD-GLUCOSE METER
EACH MISCELLANEOUS
COMMUNITY
Start: 2023-12-19

## 2024-02-06 RX ORDER — ACETAMINOPHEN 500 MG
500 TABLET ORAL EVERY 6 HOURS PRN
Status: DISCONTINUED | OUTPATIENT
Start: 2024-02-06 | End: 2024-02-06

## 2024-02-06 RX ORDER — PERPHENAZINE 16 MG/1
TABLET, FILM COATED ORAL 4 TIMES DAILY
COMMUNITY
Start: 2023-12-19 | End: 2024-12-19

## 2024-02-06 RX ORDER — ONDANSETRON 2 MG/ML
4 INJECTION INTRAMUSCULAR; INTRAVENOUS EVERY 4 HOURS PRN
Status: DISCONTINUED | OUTPATIENT
Start: 2024-02-06 | End: 2024-02-06

## 2024-02-06 NOTE — PLAN OF CARE
Problem: GASTROINTESTINAL - ADULT  Goal: Minimal or absence of nausea and vomiting  Description: INTERVENTIONS:  - Maintain adequate hydration with IV or PO as ordered and tolerated  - Nasogastric tube to low intermittent suction as ordered  - Evaluate effectiveness of ordered antiemetic medications  - Provide nonpharmacologic comfort measures as appropriate  - Advance diet as tolerated, if ordered  - Obtain nutritional consult as needed  - Evaluate fluid balance  Outcome: Completed  Goal: Maintains or returns to baseline bowel function  Description: INTERVENTIONS:  - Assess bowel function  - Maintain adequate hydration with IV or PO as ordered and tolerated  - Evaluate effectiveness of GI medications  - Encourage mobilization and activity  - Obtain nutritional consult as needed  - Establish a toileting routine/schedule  - Consider collaborating with pharmacy to review patient's medication profile  Outcome: Completed  Goal: Maintains adequate nutritional intake (undernourished)  Description: INTERVENTIONS:  - Monitor percentage of each meal consumed  - Identify factors contributing to decreased intake, treat as appropriate  - Assist with meals as needed  - Monitor I&O, WT and lab values  - Obtain nutritional consult as needed  - Optimize oral hygiene and moisture  - Encourage food from home; allow for food preferences  - Enhance eating environment  Outcome: Completed  Goal: Achieves appropriate nutritional intake (bariatric)  Description: INTERVENTIONS:  - Monitor for over-consumption  - Identify factors contributing to increased intake, treat as appropriate  - Monitor I&O, WT and lab values  - Obtain nutritional consult as needed  - Evaluate psychosocial factors contributing to over-consumption  Outcome: Completed     Problem: Patient/Family Goals  Goal: Patient/Family Long Term Goal  Description: Patient's Long Term Goal:    Interventions:  - See additional Care Plan goals for specific interventions  Outcome:  Completed  Goal: Patient/Family Short Term Goal  Description: Patient's Short Term Goal:    Interventions:   - See additional Care Plan goals for specific interventions  Outcome: Completed

## 2024-02-06 NOTE — DISCHARGE INSTRUCTIONS
Labor Discharge Instructions    Call your OB doctor if you have any of the following signs:    Period-like cramps that may come and go  Low, dull backache  Pressure in your lower abdomen that may feel like the baby is pushing down  Change in the type or amount of vaginal discharge  Abdominal cramps that may be accompanied by diarrhea  Fluid leaking from your vagina (may or may not be bloody)  Uterine Activity Instructions:

## 2024-02-06 NOTE — PROGRESS NOTES
NURSING DISCHARGE NOTE    Discharged pt via ambulation to [facility].  Accompanied by spouse.  Belongings at hand.  IV catheter d/c'd; catheter intact. Discharge instruction and education given to pt and verbalizes understanding. All questions and concerns addressed.

## 2024-02-06 NOTE — NST
Physician Evaluation      NST Interpretation: Reactive    Disposition:   eval for  labor and repeat labs    Comments:    Will reassess    Maritza Cohen MD

## 2024-02-06 NOTE — NST
Nonstress Test   Patient: Martina Centeno    Gestation: 34w0d    NST:       Variability: Moderate           Accelerations: Yes           Decelerations: None            Baseline: 135 BPM           Uterine Irritability: Yes           Contractions: Irregular                    Contraction Frequency: 1.5-8.5                   Acoustic Stimulator: No           Nonstress Test Interpretation: Reactive           Nonstress Test Second Interpretation: Reactive          FHR Category: Category I          Additional Comments

## 2024-02-06 NOTE — H&P
[facility]  History & Physical    Martina Centeno Patient Status:  Outpatient    1990 MRN [id number]   Location [facility] LABOR & DELIVERY Attending Maritza Cohen MD   Hosp Day # 0 PCP Yemi Bryan MD     HPI:   Martina Centeno is a 33 year old  who presents with complaint of contractions. Feeling contractions that are painful but inconsistent, several times per hour. Also has a headache. Baby moving, no LOF. Admitted  for headache and proteinuria, got BTMZ.    Prenatal issues:  1) Proteinuria without preeclampsia, plan delivery at 37 weeks  2) H/o 35 week PTL/PTD with first baby, second delivered at 38 weeks. On vaginal progesterone  3) GDM A1  4) Fetal growth restriction, on  EFW was 21%ile with AC 4%ile, normal dopplers  5) Migraines  6) Anxiety and depresison on sertraline      Obstetric history:   OB History    Para Term  AB Living   3 2 1 1 0 2   SAB IAB Ectopic Multiple Live Births   0 0 0 0 2          No current outpatient medications on file.      Past Medical History:   Diagnosis Date    Abdominal pain     u/s confirmed everything was okay    Acute cholecystitis 1/10/2019    Anemia     w pregnancy    Anxiety 2018    postpartum anxiety-treated xanax & zoloft (stopped taking when this preg started)    Cervical dysplasia     had bx    Mental disorder     anxiety    Migraines     Nausea & vomiting     Pancreas divisum      labor     Hx PTL and PTD @ 35 wks w pregnancy       Past Surgical History:   Procedure Laterality Date    CHOLECYSTECTOMY  2019    OTHER SURGICAL HISTORY  2019    oral surgery     UPPER GI ENDOSCOPY,BIOPSY  19= Bx: Normal      Family History   Problem Relation Age of Onset    Cancer Maternal Grandmother         Multiple, unkown    No Known Problems Father     Other (breast lump benign) Mother     Heart Disease Neg     Stroke Neg       Social History:   Social History     Socioeconomic History    Marital  status:     Number of children: 0   Occupational History    Occupation:    Tobacco Use    Smoking status: Never    Smokeless tobacco: Never   Vaping Use    Vaping Use: Never used   Substance and Sexual Activity    Alcohol use: Not Currently     Alcohol/week: 0.0 standard drinks of alcohol     Comment: occasional    Drug use: Not Currently     Types: Cannabis     Comment: Past use     Sexual activity: Yes     Partners: Male     Social Determinants of Health     Financial Resource Strain: Low Risk  (2/6/2024)    Financial Resource Strain     Difficulty of Paying Living Expenses: Not very hard     Med Affordability: No   Food Insecurity: No Food Insecurity (2/6/2024)    Food Insecurity     Food Insecurity: Never true   Transportation Needs: No Transportation Needs (2/6/2024)    Transportation Needs     Lack of Transportation: No   Stress: No Stress Concern Present (2/6/2024)    Stress     Feeling of Stress : No   Housing Stability: Low Risk  (2/6/2024)    Housing Stability     Housing Instability: No        REVIEW OF SYSTEMS:   CONSTITUTIONAL: generally feels well   EYES: normal sclera and conjunctiva, non-icteric, no cataracts  SKIN: denies any unusual skin lesions, rash, itchiness  HEENT: denies nasal congestion, sinus pain or sore throat; denies blurred vision, visual changes  CARDIOVASCULAR: denies chest pain   LUNGS:  denies shortness of breath   GI: see HPI  : see HPI  MUSCULOSKELETAL: denies back pain, muscle or joint aches  NEUROLOGIC: denies headaches  PSYCHIATRIC: denies depression or anxiety, mood is good  HEMATOLOGIC: denies history of anemia  ENDOCRINE: denies thyroid history, cold/heat intolerance  ALLERGIC: denies allergy symptoms    EXAM:   VITALS: /67 (BP Location: Right arm)   Pulse 86   Temp 98.2 °F (36.8 °C) (Oral)   Ht 5' 6\" (1.676 m)   Wt 132 lb (59.9 kg)   BMI 21.31 kg/m²   NST reactive  [place] irregular contractions  GENERAL:  Well-appearing, no apparent distress,  well nourished, well developed  SKIN: Normal, no rashes, no acne, no hirsutism, no ulcers  HEAD: Atraumatic, normocephalic  EYES: Normal extraocular movements, conjunctivae clear  NECK: No masses, symmetric  BACK: Symmetric, normal curvature, nontender  LUNGS: Unlabored breathing, no wheezing  HEART: Regular rate and rhythm  ABD: Soft, nontender and not distended, gravid  EXTREMITIES: No edema, no lesions  MUSCULOSKELETAL: Grossly normal muscle tone and range of motion  EXTERNAL GENITALIA: Normal appearance for age, no lesions, normal hair distribution for age  URETHRA: No masses or tenderness, no scarring  URETHRAL MEATUS: Normal size adn location, no lesions, no prolapse   VAGINA: Normal, physiologic discharge, no lesions; well estrogenized, well supported, no cystocele, no rectocele  CERVIX: / cephalic unchanged over 2 hours  UTERUS: Normal size, anteverted, non-tender, well supported, normal contour  ADNEXAE: Normal, no masses, no tenderness  ANUS: No lesions, no masses  PERINEUM: Normal appearance, no lesions, well supported, no erythema  PSYCHIATRIC: Patient oriented to time, place and person; mood and affect appropriate    Recent Labs   Lab 24  1036   RBC 3.66*   HGB 11.8*   HCT 34.7*   MCV 94.8   MCH 32.2   MCHC 34.0   RDW 13.7   NEPRELIM 8.02*   WBC 10.5   .0     Recent Labs   Lab 24  1036   *   BUN 6*   CREATSERUM 0.43*   EGFRCR 132   CA 8.8   ALB 2.8*      K 3.8      CO2 20.0*   ALKPHO 114*   AST 11*   ALT 19   BILT 0.1   TP 6.5       ASSESSMENT AND PLAN:   Martina Centeno is a 33 year old female who presents with complaint of  contractions, rule out  labor.    1) R/o  labor: no change over 2 hours. Precautions reviewed. Light activity at home. H/o 35 week PTL/PTD followed by 38 week   -got BTMZ -  -34 weeks so would manage labor expectantly if she returns in more advanced labor  2) Proteinuria, still no si/sx preeclampsia  -delivery  at 37 weeks  3) Migraines: refilled fioricet  4) GDM A1  5) Anxiety and depression on sertraline  6) Fetal growth restriction: AC 4%ile, normal dopplers. MFM on Friday    Home with precautions    There are no diagnoses linked to this encounter.    Pt voiced understanding of all instructions; all questions answered; no barriers to learning.      Maritza Cohen MD  2/6/2024  12:50 PM

## 2024-02-06 NOTE — PROGRESS NOTES
Pt is a 33 year old female admitted to [facility].     Chief Complaint   Patient presents with    R/o Labor      Pt is  34w0d intra-uterine pregnancy.  History obtained, consents signed. Oriented to room, staff, and plan of care.

## 2024-02-09 ENCOUNTER — ULTRASOUND ENCOUNTER (OUTPATIENT)
Dept: PERINATAL CARE | Facility: HOSPITAL | Age: 34
End: 2024-02-09
Attending: OBSTETRICS & GYNECOLOGY
Payer: COMMERCIAL

## 2024-02-09 VITALS
HEART RATE: 92 BPM | DIASTOLIC BLOOD PRESSURE: 79 MMHG | BODY MASS INDEX: 21 KG/M2 | WEIGHT: 131 LBS | SYSTOLIC BLOOD PRESSURE: 125 MMHG

## 2024-02-09 DIAGNOSIS — O36.5930 POOR FETAL GROWTH AFFECTING MANAGEMENT OF MOTHER IN THIRD TRIMESTER, SINGLE OR UNSPECIFIED FETUS: ICD-10-CM

## 2024-02-09 DIAGNOSIS — O14.93 PRE-ECLAMPSIA IN THIRD TRIMESTER: Primary | ICD-10-CM

## 2024-02-09 DIAGNOSIS — O14.93 PRE-ECLAMPSIA IN THIRD TRIMESTER: ICD-10-CM

## 2024-02-09 PROCEDURE — 76815 OB US LIMITED FETUS(S): CPT

## 2024-02-09 PROCEDURE — 76820 UMBILICAL ARTERY ECHO: CPT

## 2024-02-09 PROCEDURE — 76819 FETAL BIOPHYS PROFIL W/O NST: CPT | Performed by: OBSTETRICS & GYNECOLOGY

## 2024-02-09 RX ORDER — MELATONIN
325
COMMUNITY

## 2024-02-09 NOTE — PROGRESS NOTES
Outpatient Maternal-Fetal Medicine Follow-Up     Dear Dr. Trinidad     Thank you for requesting ultrasound evaluation and maternal fetal medicine consultation on your patient Martina Centeno.  As you are aware she is a 33 year old female  with a mcneal pregnancy and an Estimated Date of Delivery: 3/19/24.  She returned to maternal-fetal medicine today for a follow-up visit.  Her history was reviewed from her prior visit and there were no interval changes.     Antepartum Risk Factors  Fetal growth restriction  Mild preeclampsia without severe features  Maternal migraines, suboptimally treated due to medication limitations in pregnancy     PHYSICAL EXAMINATION:  /83 (BP Location: Right arm, Patient Position: Sitting, Cuff Size: adult)   Pulse 93   Ht 5' 6\" (1.676 m)   Wt 132 lb (59.9 kg)   BMI 21.31 kg/m²   General: alert and oriented, no acute distress  Abdomen: gravid, soft, non-tender  Extremities: non-tender, no edema     OBSTETRIC ULTRASOUND     Ultrasound Findings:  Single IUP in cephalic presentation.    Placenta is anterior.   A 3 vessel cord is noted.  Cardiac activity is present at 121 bpm  EFW 2176 g ( 4 lb 13 oz); 15%.  AC 8%  JULIET is  13.7 cm.  MVP is 5 cm  BPP is 8/8.   Normal UA Dopplers - S/D  - 2.75 (67%)  The fetal measurements are consistent with established EDC. No gross ultrasound evidence of structural abnormalities are seen today. The patient understands that ultrasound cannot rule out all structural and chromosomal abnormalities.     See PACS/Imaging Tab For Complete Ultrasound Report  I interpreted the results and reviewed them with the patient.     DISCUSSION  During her visit we discussed and reviewed the following issues:  PREECLAMPSIA     Patient Review   Admitted 24-24.      Preeclampsia Labs     24-hour 342 mg  Serum labs - 24  AST: 16  ALT 16  Creatinine: 0.38  Plt: 309     BP Review  Anti-Hypertensive medications:  No Medications  Home BP's:  110-120's/80-95's  Her blood pressure control is adequate.  Medical Regimen Recommendation: no change.     Discussion  See prior MFM notes for a detailed review.     FETAL GROWTH RESTRICTION  FGR was diagnosed on prior ultrasound.  The patient returned for a follow-up assessment today.    DISCUSSION  See prior MFM notes for a detailed review.    Category  FGR (AC < 10%) with NORMAL UA Dopplers    Subsequent Management  See below     IMPRESSION:  IUP at 35w2d  Fetal growth restriction - BPP /8 , normal umbilical artery Doppler  Mild preeclampsia without severe features  Maternal migraines, suboptimally treated due to medication limitations in pregnancy - persistent  GDM A1 - on ADA diet, managed by OB     RECOMMENDATIONS:  Continue care with Dr. Trinidad  Twice weekly  testing -weekly NST with OB office and weekly BPP with MFM office  Delivery is advised at 36- 37 weeks due to preeclampsia with FGR and persistent maternal headaches - unless an indication for earlier delivery arises     Thank you for allowing me to participate in the care of your patient.  Please do not hesitate to contact me if additional questions or concerns arise.       Ld Block M.D.     30 minutes spent in review of records, patient consultation, documentation and coordination of care.  The relevant clinical matter(s) are summarized above.      Note to patient and family  The 21st Century Cures Act makes medical notes available to patients in the interest of transparency.  However, please be advised that this is a medical document.  It is intended as gedt-wv-jrex communication.  It is written and medical language may contain abbreviations or verbiage that are technical and unfamiliar.  It may appear blunt or direct.  Medical documents are intended to carry relevant information, facts as evident, and the clinical opinion of the practitioner.

## 2024-02-15 ENCOUNTER — ULTRASOUND ENCOUNTER (OUTPATIENT)
Dept: PERINATAL CARE | Facility: HOSPITAL | Age: 34
End: 2024-02-15
Attending: OBSTETRICS & GYNECOLOGY
Payer: COMMERCIAL

## 2024-02-15 VITALS
HEIGHT: 66 IN | SYSTOLIC BLOOD PRESSURE: 122 MMHG | BODY MASS INDEX: 21.21 KG/M2 | DIASTOLIC BLOOD PRESSURE: 83 MMHG | WEIGHT: 132 LBS | HEART RATE: 93 BPM

## 2024-02-15 DIAGNOSIS — O36.5930 POOR FETAL GROWTH AFFECTING MANAGEMENT OF MOTHER IN THIRD TRIMESTER, SINGLE OR UNSPECIFIED FETUS: Primary | ICD-10-CM

## 2024-02-15 DIAGNOSIS — O36.5930 POOR FETAL GROWTH AFFECTING MANAGEMENT OF MOTHER IN THIRD TRIMESTER, SINGLE OR UNSPECIFIED FETUS: ICD-10-CM

## 2024-02-15 DIAGNOSIS — O14.93 PRE-ECLAMPSIA, THIRD TRIMESTER: ICD-10-CM

## 2024-02-15 PROCEDURE — 76819 FETAL BIOPHYS PROFIL W/O NST: CPT

## 2024-02-15 PROCEDURE — 76816 OB US FOLLOW-UP PER FETUS: CPT | Performed by: OBSTETRICS & GYNECOLOGY

## 2024-02-15 PROCEDURE — 76820 UMBILICAL ARTERY ECHO: CPT

## 2024-02-19 ENCOUNTER — TELEPHONE (OUTPATIENT)
Dept: OBGYN UNIT | Facility: HOSPITAL | Age: 34
End: 2024-02-19

## 2024-02-21 ENCOUNTER — APPOINTMENT (OUTPATIENT)
Dept: OBGYN CLINIC | Facility: HOSPITAL | Age: 34
End: 2024-02-21
Payer: COMMERCIAL

## 2024-02-21 ENCOUNTER — ANESTHESIA EVENT (OUTPATIENT)
Dept: OBGYN UNIT | Facility: HOSPITAL | Age: 34
End: 2024-02-21
Payer: COMMERCIAL

## 2024-02-21 ENCOUNTER — ANESTHESIA EVENT (OUTPATIENT)
Dept: OBGYN CLINIC | Facility: HOSPITAL | Age: 34
End: 2024-02-21
Payer: COMMERCIAL

## 2024-02-21 ENCOUNTER — HOSPITAL ENCOUNTER (INPATIENT)
Facility: HOSPITAL | Age: 34
LOS: 2 days | Discharge: HOME | End: 2024-02-23
Attending: OBSTETRICS & GYNECOLOGY | Admitting: OBSTETRICS & GYNECOLOGY
Payer: COMMERCIAL

## 2024-02-21 ENCOUNTER — ANESTHESIA (OUTPATIENT)
Dept: OBGYN UNIT | Facility: HOSPITAL | Age: 34
End: 2024-02-21
Payer: COMMERCIAL

## 2024-02-21 ENCOUNTER — ANESTHESIA (OUTPATIENT)
Dept: OBGYN CLINIC | Facility: HOSPITAL | Age: 34
End: 2024-02-21
Payer: COMMERCIAL

## 2024-02-21 DIAGNOSIS — R51.9 ACUTE INTRACTABLE HEADACHE, UNSPECIFIED HEADACHE TYPE: Primary | ICD-10-CM

## 2024-02-21 LAB
ALBUMIN SERPL-MCNC: 2.8 G/DL (ref 3.4–5)
ALBUMIN/GLOB SERPL: 0.8 {RATIO} (ref 1–2)
ALP LIVER SERPL-CCNC: 131 U/L
ALT SERPL-CCNC: 18 U/L
ANION GAP SERPL CALC-SCNC: 10 MMOL/L (ref 0–18)
ANTIBODY SCREEN: NEGATIVE
AST SERPL-CCNC: 13 U/L (ref 15–37)
BASOPHILS # BLD AUTO: 0.02 X10(3) UL (ref 0–0.2)
BASOPHILS NFR BLD AUTO: 0.2 %
BILIRUB SERPL-MCNC: 0.2 MG/DL (ref 0.1–2)
BUN BLD-MCNC: 8 MG/DL (ref 9–23)
CALCIUM BLD-MCNC: 9.2 MG/DL (ref 8.5–10.1)
CHLORIDE SERPL-SCNC: 104 MMOL/L (ref 98–112)
CO2 SERPL-SCNC: 24 MMOL/L (ref 21–32)
CREAT BLD-MCNC: 0.49 MG/DL
CREAT UR-SCNC: 99.5 MG/DL
EGFRCR SERPLBLD CKD-EPI 2021: 128 ML/MIN/1.73M2 (ref 60–?)
EOSINOPHIL # BLD AUTO: 0.1 X10(3) UL (ref 0–0.7)
EOSINOPHIL NFR BLD AUTO: 1.1 %
ERYTHROCYTE [DISTWIDTH] IN BLOOD BY AUTOMATED COUNT: 13.8 %
GLOBULIN PLAS-MCNC: 3.7 G/DL (ref 2.8–4.4)
GLUCOSE BLD-MCNC: 123 MG/DL (ref 70–99)
GLUCOSE BLD-MCNC: 124 MG/DL (ref 70–99)
HCT VFR BLD AUTO: 34.4 %
HGB BLD-MCNC: 11.5 G/DL
IMM GRANULOCYTES # BLD AUTO: 0.04 X10(3) UL (ref 0–1)
IMM GRANULOCYTES NFR BLD: 0.4 %
LYMPHOCYTES # BLD AUTO: 1.63 X10(3) UL (ref 1–4)
LYMPHOCYTES NFR BLD AUTO: 17.4 %
MCH RBC QN AUTO: 31.8 PG (ref 26–34)
MCHC RBC AUTO-ENTMCNC: 33.4 G/DL (ref 31–37)
MCV RBC AUTO: 95 FL
MONOCYTES # BLD AUTO: 0.56 X10(3) UL (ref 0.1–1)
MONOCYTES NFR BLD AUTO: 6 %
NEUTROPHILS # BLD AUTO: 7.03 X10 (3) UL (ref 1.5–7.7)
NEUTROPHILS # BLD AUTO: 7.03 X10(3) UL (ref 1.5–7.7)
NEUTROPHILS NFR BLD AUTO: 74.9 %
OSMOLALITY SERPL CALC.SUM OF ELEC: 286 MOSM/KG (ref 275–295)
PLATELET # BLD AUTO: 255 10(3)UL (ref 150–450)
POTASSIUM SERPL-SCNC: 3.9 MMOL/L (ref 3.5–5.1)
PROT SERPL-MCNC: 6.5 G/DL (ref 6.4–8.2)
PROT UR-MCNC: 26.7 MG/DL
PROT/CREAT UR-RTO: 0.27
RBC # BLD AUTO: 3.62 X10(6)UL
RH BLOOD TYPE: POSITIVE
SODIUM SERPL-SCNC: 138 MMOL/L (ref 136–145)
T PALLIDUM AB SER QL IA: NONREACTIVE
URATE SERPL-MCNC: 3.9 MG/DL
WBC # BLD AUTO: 9.4 X10(3) UL (ref 4–11)

## 2024-02-21 PROCEDURE — 84550 ASSAY OF BLOOD/URIC ACID: CPT | Performed by: OBSTETRICS & GYNECOLOGY

## 2024-02-21 PROCEDURE — 86850 RBC ANTIBODY SCREEN: CPT | Performed by: OBSTETRICS & GYNECOLOGY

## 2024-02-21 PROCEDURE — 3E033VJ INTRODUCTION OF OTHER HORMONE INTO PERIPHERAL VEIN, PERCUTANEOUS APPROACH: ICD-10-PCS | Performed by: OBSTETRICS & GYNECOLOGY

## 2024-02-21 PROCEDURE — 85025 COMPLETE CBC W/AUTO DIFF WBC: CPT | Performed by: OBSTETRICS & GYNECOLOGY

## 2024-02-21 PROCEDURE — 86900 BLOOD TYPING SEROLOGIC ABO: CPT | Performed by: OBSTETRICS & GYNECOLOGY

## 2024-02-21 PROCEDURE — 82570 ASSAY OF URINE CREATININE: CPT | Performed by: OBSTETRICS & GYNECOLOGY

## 2024-02-21 PROCEDURE — 10907ZC DRAINAGE OF AMNIOTIC FLUID, THERAPEUTIC FROM PRODUCTS OF CONCEPTION, VIA NATURAL OR ARTIFICIAL OPENING: ICD-10-PCS | Performed by: OBSTETRICS & GYNECOLOGY

## 2024-02-21 PROCEDURE — 86901 BLOOD TYPING SEROLOGIC RH(D): CPT | Performed by: OBSTETRICS & GYNECOLOGY

## 2024-02-21 PROCEDURE — 82947 ASSAY GLUCOSE BLOOD QUANT: CPT | Performed by: OBSTETRICS & GYNECOLOGY

## 2024-02-21 PROCEDURE — 86780 TREPONEMA PALLIDUM: CPT | Performed by: OBSTETRICS & GYNECOLOGY

## 2024-02-21 PROCEDURE — 84156 ASSAY OF PROTEIN URINE: CPT | Performed by: OBSTETRICS & GYNECOLOGY

## 2024-02-21 PROCEDURE — 88307 TISSUE EXAM BY PATHOLOGIST: CPT | Performed by: OBSTETRICS & GYNECOLOGY

## 2024-02-21 PROCEDURE — 80053 COMPREHEN METABOLIC PANEL: CPT | Performed by: OBSTETRICS & GYNECOLOGY

## 2024-02-21 RX ORDER — CITRIC ACID/SODIUM CITRATE 334-500MG
30 SOLUTION, ORAL ORAL AS NEEDED
Status: DISCONTINUED | OUTPATIENT
Start: 2024-02-21 | End: 2024-02-21

## 2024-02-21 RX ORDER — IBUPROFEN 600 MG/1
600 TABLET ORAL ONCE AS NEEDED
Status: DISCONTINUED | OUTPATIENT
Start: 2024-02-21 | End: 2024-02-21

## 2024-02-21 RX ORDER — BISACODYL 10 MG
10 SUPPOSITORY, RECTAL RECTAL ONCE AS NEEDED
Status: DISCONTINUED | OUTPATIENT
Start: 2024-02-21 | End: 2024-02-23

## 2024-02-21 RX ORDER — ACETAMINOPHEN 500 MG
1000 TABLET ORAL EVERY 6 HOURS PRN
Status: DISCONTINUED | OUTPATIENT
Start: 2024-02-21 | End: 2024-02-21

## 2024-02-21 RX ORDER — ONDANSETRON 2 MG/ML
4 INJECTION INTRAMUSCULAR; INTRAVENOUS EVERY 6 HOURS PRN
Status: DISCONTINUED | OUTPATIENT
Start: 2024-02-21 | End: 2024-02-21

## 2024-02-21 RX ORDER — ACETAMINOPHEN 500 MG
1000 TABLET ORAL EVERY 6 HOURS PRN
Status: DISCONTINUED | OUTPATIENT
Start: 2024-02-21 | End: 2024-02-22

## 2024-02-21 RX ORDER — LIDOCAINE HYDROCHLORIDE AND EPINEPHRINE 15; 5 MG/ML; UG/ML
INJECTION, SOLUTION EPIDURAL AS NEEDED
Status: DISCONTINUED | OUTPATIENT
Start: 2024-02-21 | End: 2024-02-21 | Stop reason: SURG

## 2024-02-21 RX ORDER — ACETAMINOPHEN 500 MG
500 TABLET ORAL EVERY 6 HOURS PRN
Status: DISCONTINUED | OUTPATIENT
Start: 2024-02-21 | End: 2024-02-23

## 2024-02-21 RX ORDER — DEXTROSE, SODIUM CHLORIDE, SODIUM LACTATE, POTASSIUM CHLORIDE, AND CALCIUM CHLORIDE 5; .6; .31; .03; .02 G/100ML; G/100ML; G/100ML; G/100ML; G/100ML
INJECTION, SOLUTION INTRAVENOUS AS NEEDED
Status: DISCONTINUED | OUTPATIENT
Start: 2024-02-21 | End: 2024-02-21

## 2024-02-21 RX ORDER — BUPIVACAINE HCL/0.9 % NACL/PF 0.25 %
5 PLASTIC BAG, INJECTION (ML) EPIDURAL AS NEEDED
Status: DISCONTINUED | OUTPATIENT
Start: 2024-02-21 | End: 2024-02-21

## 2024-02-21 RX ORDER — SODIUM CHLORIDE, SODIUM LACTATE, POTASSIUM CHLORIDE, CALCIUM CHLORIDE 600; 310; 30; 20 MG/100ML; MG/100ML; MG/100ML; MG/100ML
INJECTION, SOLUTION INTRAVENOUS CONTINUOUS
Status: DISCONTINUED | OUTPATIENT
Start: 2024-02-21 | End: 2024-02-21

## 2024-02-21 RX ORDER — IBUPROFEN 600 MG/1
600 TABLET ORAL EVERY 6 HOURS
Status: DISCONTINUED | OUTPATIENT
Start: 2024-02-21 | End: 2024-02-23

## 2024-02-21 RX ORDER — DOCUSATE SODIUM 100 MG/1
100 CAPSULE, LIQUID FILLED ORAL
Status: DISCONTINUED | OUTPATIENT
Start: 2024-02-21 | End: 2024-02-23

## 2024-02-21 RX ORDER — NALBUPHINE HYDROCHLORIDE 10 MG/ML
2.5 INJECTION, SOLUTION INTRAMUSCULAR; INTRAVENOUS; SUBCUTANEOUS
Status: DISCONTINUED | OUTPATIENT
Start: 2024-02-21 | End: 2024-02-21

## 2024-02-21 RX ORDER — ACETAMINOPHEN 500 MG
500 TABLET ORAL EVERY 6 HOURS PRN
Status: DISCONTINUED | OUTPATIENT
Start: 2024-02-21 | End: 2024-02-21

## 2024-02-21 RX ORDER — SIMETHICONE 80 MG
80 TABLET,CHEWABLE ORAL 3 TIMES DAILY PRN
Status: DISCONTINUED | OUTPATIENT
Start: 2024-02-21 | End: 2024-02-23

## 2024-02-21 RX ORDER — TERBUTALINE SULFATE 1 MG/ML
0.25 INJECTION, SOLUTION SUBCUTANEOUS AS NEEDED
Status: DISCONTINUED | OUTPATIENT
Start: 2024-02-21 | End: 2024-02-21

## 2024-02-21 RX ADMIN — LIDOCAINE HYDROCHLORIDE AND EPINEPHRINE 2 ML: 15; 5 INJECTION, SOLUTION EPIDURAL at 11:21:00

## 2024-02-21 RX ADMIN — LIDOCAINE HYDROCHLORIDE AND EPINEPHRINE 3 ML: 15; 5 INJECTION, SOLUTION EPIDURAL at 11:18:00

## 2024-02-21 NOTE — ANESTHESIA PROCEDURE NOTES
Labor Analgesia    Date/Time: 2/21/2024 11:10 AM    Performed by: Cornelius Ortiz MD  Authorized by: Cornelius Ortiz MD      General Information and Staff    Start Time:  2/21/2024 11:10 AM  End Time:  2/21/2024 11:10 AM  Anesthesiologist:  Cornelius Ortiz MD  Performed by:  Anesthesiologist  Patient Location:  OB  Site Identification: surface landmarks    Reason for Block: labor epidural    Preanesthetic Checklist: patient identified, IV checked, risks and benefits discussed, monitors and equipment checked, pre-op evaluation, timeout performed, IV bolus, anesthesia consent and sterile technique used      Procedure Details    Patient Position:  Sitting  Prep: ChloraPrep    Monitoring:  Heart rate and continuous pulse ox  Approach:  Midline    Epidural Needle    Injection Technique:   Needle Type:  Tuohy  Needle Gauge:  17 G  Needle Length:  3.375 in  Location:  L3-4    Spinal Needle      Catheter    Catheter Type:  End hole  Catheter Size:  19 G  Test Dose:  Negative    Assessment      Additional Comments

## 2024-02-21 NOTE — H&P
[facility]  History & Physical    Martina Centeno Patient Status:  Inpatient    1990 MRN [id number]   Location [facility] LABOR & DELIVERY Attending Maritza Cohen MD   Hosp Day # 0 PCP Yemi Bryan MD     SUBJECTIVE:    Martina Centeno is a 33 year old  at 36+1 weeks here for IOL for preeclampsia without severe features and fetal growth restriction. Good FM, no Vb, no LOF, few ctx. Saw MFM 2/15 and EFW 15%ile, AC 8%ile, dopplers WNL. Delivery was recommended at 36-37 weeks.    Prenatal issues:  1) Fetal growth restriction, EFW 15%ile AC 8%ile on 2/15, normal dopplers  2) Preeclampsia without severe features, 24 hour 324mg  3) H/o 35 week PTL/PTD with first baby, delivery at 38 weeks with second. Seen for  contractions and headache this pregnancy--got BTMZ -  4) H/o depression on sertraline  5) Migraines  6) GDM A1    Obstetric History:   OB History    Para Term  AB Living   3 2 1 1   2   SAB IAB Ectopic Multiple Live Births         0 2      # Outcome Date GA Lbr Steve/2nd Weight Sex Delivery Anes PTL Lv   3 Current            2 Term 19 38w6d 03:45 / 00:51 6 lb 0.3 oz (2.73 kg) M NORMAL SPONT EPI N ALEJA      Complications:  labor   1  18 35w4d 08:00 / 03:14 6 lb 5.2 oz (2.87 kg) M NORMAL SPONT EPI Y ALEJA      Birth Comments: Mitch Trinidad MD      Complications: Variable decelerations, Fetal bradycardia,  labor     Past Medical History:   Past Medical History:   Diagnosis Date    Abdominal pain     u/s confirmed everything was okay    Acute cholecystitis 1/10/2019    Anemia     w/  pregnancy    Anxiety 2018    postpartum anxiety-treated xanax & zoloft (stopped taking when this preg started)    Cervical dysplasia     had bx    Mental disorder     anxiety    Migraines     Nausea & vomiting     Pancreas divisum      labor (HCC)     Hx PTL and PTD @ 35 wks w/  pregnancy 2018     Past Social History:   Past Surgical  History:   Procedure Laterality Date    CHOLECYSTECTOMY  01/31/2019    OTHER SURGICAL HISTORY  07/2019    oral surgery     UPPER GI ENDOSCOPY,BIOPSY  1/12/19= Bx: Normal     Family History:   Family History   Problem Relation Age of Onset    Cancer Maternal Grandmother         Multiple, unkown    No Known Problems Father     Other (breast lump benign) Mother     Heart Disease Neg     Stroke Neg      Social History:   Social History     Tobacco Use    Smoking status: Never    Smokeless tobacco: Never   Substance Use Topics    Alcohol use: Not Currently     Alcohol/week: 0.0 standard drinks of alcohol     Comment: occasional       Home Meds:   Medications Prior to Admission   Medication Sig Dispense Refill Last Dose    MAGNESIUM OR Take 400 mg by mouth daily.   2/20/2024    cholecalciferol 50 MCG (2000 UT) Oral Cap Take 1 capsule (2,000 Units total) by mouth daily.   2/20/2024    metoclopramide 5 MG Oral Tab Take 1 tablet (5 mg total) by mouth 3 (three) times daily as needed (headache). 30 tablet 0 Past Week    prenatal vitamin with DHA 27-0.8-228 MG Oral Cap Take 1 capsule by mouth daily.   2/20/2024    progesterone 200 MG Oral Cap Place 1 capsule (200 mg total) vaginally nightly.   Past Week    ferrous sulfate 325 (65 FE) MG Oral Tab EC Take 1 tablet (325 mg total) by mouth daily with breakfast.       Microlet Lancets Does not apply Misc 1 Lancet by Finger stick route 4 (four) times daily. FASTING AND 2 HOURS AFTER EACH MEAL       CONTOUR NEXT TEST In Vitro Strip in the morning, at noon, in the evening, and at bedtime.       Blood Glucose Monitoring Suppl (CONTOUR NEXT ONE) Does not apply Device use 1 kit As Directed       Codeine Sulfate 15 MG Oral Tab Take 1 tablet (15 mg total) by mouth every 6 (six) hours as needed for Pain. (Patient not taking: Reported on 2/15/2024) 15 tablet 0     sertraline 50 MG Oral Tab Take 1 tablet (50 mg total) by mouth daily.        Allergies: No Known Allergies    OBJECTIVE:    Temp:   [98.5 °F (36.9 °C)] 98.5 °F (36.9 °C)  Resp:  [16] 16  BP: (123)/(86) 123/86    Lungs:    Unlabored breathing, no wheezing   Heart:   regular rate and rhythm   Abdomen: Soft NT ND   Fetal Surveillance:  140 BPM   Mod costa, +accels, no decels      Cervix: 1.5/70/-2     Lab Review:  B, Rh+, Rubella-immune, Hepatitis B surface antigen non-reactive, GBS negative     ASSESSMENT:   at 36+1 weeks here for IOL for fetal growth restriction and preeclampsia without severe features.    PLAN:  1) FHT reassuring  2) IOL: pitocin and AROM  3) GBS neg  4) Ok for epidural  5) GDM A1, accucheck on admission  6) Mild preeclampsia: repeat labs. No magnesium if no severe features  7) Anxiety/depression on sertraline    Risks, benefits, alternatives and possible complications have been discussed in detail with the patient.  Pre-admission, admission, and post admission procedures and expectations were discussed in detail.  All questions answered, all appropriate consents will be signed at the Hospital. Admission is planned for today.   Continue present management..    Maritza Cohen MD  2024  10:01 AM

## 2024-02-21 NOTE — PROGRESS NOTES
Patient up to bathroom with assist x 2.  Voided 500 . Patient transferred to mother/baby room 2216 per wheelchair in stable condition with baby and personal belongings.  Accompanied by significant other and staff.  Report given to mother/baby RN.

## 2024-02-21 NOTE — ANESTHESIA PREPROCEDURE EVALUATION
PRE-OP EVALUATION    Patient Name: Martina Centeno    Admit Diagnosis: pregnancy  Pregnancy (HCC)    Pre-op Diagnosis: * No pre-op diagnosis entered *        Anesthesia Procedure: LABOR ANALGESIA    * No surgeons found in log *    Pre-op vitals reviewed.  Temp: 98.2 °F (36.8 °C)  Pulse: 81  Resp: 16  BP: 118/81     Body mass index is 21.31 kg/m².    Current medications reviewed.  Hospital Medications:   lactated ringers infusion   Intravenous Continuous    dextrose in lactated ringers 5% infusion   Intravenous PRN    lactated ringers IV bolus 500 mL  500 mL Intravenous PRN    acetaminophen (Tylenol Extra Strength) tab 500 mg  500 mg Oral Q6H PRN    acetaminophen (Tylenol Extra Strength) tab 1,000 mg  1,000 mg Oral Q6H PRN    ibuprofen (Motrin) tab 600 mg  600 mg Oral Once PRN    ondansetron (Zofran) 4 MG/2ML injection 4 mg  4 mg Intravenous Q6H PRN    oxyTOCIN in sodium chloride 0.9% (Pitocin) 30 Units/500mL infusion premix  62.5-900 navya-units/min Intravenous Continuous    terbutaline (Brethine) 1 MG/ML injection 0.25 mg  0.25 mg Subcutaneous PRN    sodium citrate-citric acid (Bicitra) 500-334 MG/5ML oral solution 30 mL  30 mL Oral PRN    oxyTOCIN in sodium chloride 0.9% (Pitocin) 30 Units/500mL infusion premix  0.5-20 navya-units/min Intravenous Continuous    [COMPLETED] lactated ringers IV bolus 1,000 mL  1,000 mL Intravenous Once    fentaNYL-bupivacaine 2 mcg/mL-0.125% in sodium chloride 0.9% 100 mL EPIDURAL infusion premix  12 mL/hr Epidural Continuous    [COMPLETED] fentaNYL (Sublimaze) 50 mcg/mL injection 100 mcg  100 mcg Epidural Once    EPHEDrine (PF) 25 MG/5 ML injection 5 mg  5 mg Intravenous PRN    nalbuphine (Nubain) 10 mg/mL injection 2.5 mg  2.5 mg Intravenous Q15 Min PRN    lidocaine 1.5%-EPINEPHrine 1:200,000 (Xylocaine-Epinephrine) injection   Epidural PRN       Outpatient Medications:     Medications Prior to Admission   Medication Sig Dispense Refill Last Dose    MAGNESIUM OR Take 400 mg by mouth  daily.   2/20/2024    cholecalciferol 50 MCG (2000 UT) Oral Cap Take 1 capsule (2,000 Units total) by mouth daily.   2/20/2024    metoclopramide 5 MG Oral Tab Take 1 tablet (5 mg total) by mouth 3 (three) times daily as needed (headache). 30 tablet 0 Past Week    prenatal vitamin with DHA 27-0.8-228 MG Oral Cap Take 1 capsule by mouth daily.   2/20/2024    progesterone 200 MG Oral Cap Place 1 capsule (200 mg total) vaginally nightly.   Past Week    ferrous sulfate 325 (65 FE) MG Oral Tab EC Take 1 tablet (325 mg total) by mouth daily with breakfast.       Microlet Lancets Does not apply Misc 1 Lancet by Finger stick route 4 (four) times daily. FASTING AND 2 HOURS AFTER EACH MEAL       CONTOUR NEXT TEST In Vitro Strip in the morning, at noon, in the evening, and at bedtime.       Blood Glucose Monitoring Suppl (CONTOUR NEXT ONE) Does not apply Device use 1 kit As Directed       Codeine Sulfate 15 MG Oral Tab Take 1 tablet (15 mg total) by mouth every 6 (six) hours as needed for Pain. (Patient not taking: Reported on 2/15/2024) 15 tablet 0     sertraline 50 MG Oral Tab Take 1 tablet (50 mg total) by mouth daily.          Allergies: Patient has no known allergies.      Anesthesia Evaluation    Patient summary reviewed.    Anesthetic Complications           GI/Hepatic/Renal                                 Cardiovascular                                                       Endo/Other                                  Pulmonary                           Neuro/Psych                                      Past Surgical History:   Procedure Laterality Date    CHOLECYSTECTOMY  01/31/2019    OTHER SURGICAL HISTORY  07/2019    oral surgery     UPPER GI ENDOSCOPY,BIOPSY  1/12/19= Bx: Normal     Social History     Socioeconomic History    Marital status:     Number of children: 0   Occupational History    Occupation:    Tobacco Use    Smoking status: Never    Smokeless tobacco: Never   Vaping Use    Vaping Use:  Never used   Substance and Sexual Activity    Alcohol use: Not Currently     Alcohol/week: 0.0 standard drinks of alcohol     Comment: occasional    Drug use: Not Currently     Types: Cannabis     Comment: Past use     Sexual activity: Yes     Partners: Male     History   Drug Use Unknown     Comment: Past use      Available pre-op labs reviewed.  Lab Results   Component Value Date    WBC 9.4 02/21/2024    RBC 3.62 (L) 02/21/2024    HGB 11.5 (L) 02/21/2024    HCT 34.4 (L) 02/21/2024    MCV 95.0 02/21/2024    MCH 31.8 02/21/2024    MCHC 33.4 02/21/2024    RDW 13.8 02/21/2024    .0 02/21/2024     Lab Results   Component Value Date     02/21/2024    K 3.9 02/21/2024     02/21/2024    CO2 24.0 02/21/2024    BUN 8 (L) 02/21/2024    CREATSERUM 0.49 (L) 02/21/2024     (H) 02/21/2024    CA 9.2 02/21/2024            Airway      Mallampati: II  Mouth opening: >3 FB  TM distance: > 6 cm  Neck ROM: full Cardiovascular    Cardiovascular exam normal.         Dental             Pulmonary    Pulmonary exam normal.                 Other findings              ASA: 2   Plan: epidural             Plan/risks discussed with: patient                Present on Admission:  **None**

## 2024-02-21 NOTE — PLAN OF CARE
Problem: BIRTH - VAGINAL/ SECTION  Goal: Fetal and maternal status remain reassuring during the birth process  Description: INTERVENTIONS:  - Monitor vital signs  - Monitor fetal heart rate  - Monitor uterine activity  - Monitor labor progression (vaginal delivery)  - DVT prophylaxis (C/S delivery)  - Surgical antibiotic prophylaxis (C/S delivery)  Outcome: Progressing     Problem: PAIN - ADULT  Goal: Verbalizes/displays adequate comfort level or patient's stated pain goal  Description: INTERVENTIONS:  - Encourage pt to monitor pain and request assistance  - Assess pain using appropriate pain scale  - Administer analgesics based on type and severity of pain and evaluate response  - Implement non-pharmacological measures as appropriate and evaluate response  - Consider cultural and social influences on pain and pain management  - Manage/alleviate anxiety  - Utilize distraction and/or relaxation techniques  - Monitor for opioid side effects  - Notify MD/LIP if interventions unsuccessful or patient reports new pain  - Anticipate increased pain with activity and pre-medicate as appropriate  Outcome: Progressing     Problem: ANXIETY  Goal: Will report anxiety at manageable levels  Description: INTERVENTIONS:  - Administer medication as ordered  - Teach and rehearse alternative coping skills  - Provide emotional support with 1:1 interaction with staff  Outcome: Progressing     Problem: Patient/Family Goals  Goal: Patient/Family Long Term Goal  Description: Patient's Long Term Goal:     Interventions:  -   - See additional Care Plan goals for specific interventions  Outcome: Progressing  Goal: Patient/Family Short Term Goal  Description: Patient's Short Term Goal:     Interventions:   -   - See additional Care Plan goals for specific interventions  Outcome: Progressing

## 2024-02-21 NOTE — PROGRESS NOTES
Pt is a 33 year old female admitted to .     Chief Complaint   Patient presents with    Scheduled Induction      Pt is  36w1d intra-uterine pregnancy.  History obtained, consents signed. Oriented to room, staff, and plan of care.Pt is a 33 year old female admitted to [facility].

## 2024-02-21 NOTE — PROGRESS NOTES
NURSING ADMISSION NOTE      Patient admitted via Wheelchair  Oriented to room.  Safety precautions initiated.  Bed in low position.  Call light in reach.  Teaching initiated, care plan discussed, Both mom and infant ID bands verified. Hugs and kisses on,

## 2024-02-21 NOTE — PLAN OF CARE
Problem: BIRTH - VAGINAL/ SECTION  Goal: Fetal and maternal status remain reassuring during the birth process  Description: INTERVENTIONS:  - Monitor vital signs  - Monitor fetal heart rate  - Monitor uterine activity  - Monitor labor progression (vaginal delivery)  - DVT prophylaxis (C/S delivery)  - Surgical antibiotic prophylaxis (C/S delivery)  Outcome: Progressing     Problem: PAIN - ADULT  Goal: Verbalizes/displays adequate comfort level or patient's stated pain goal  Description: INTERVENTIONS:  - Encourage pt to monitor pain and request assistance  - Assess pain using appropriate pain scale  - Administer analgesics based on type and severity of pain and evaluate response  - Implement non-pharmacological measures as appropriate and evaluate response  - Consider cultural and social influences on pain and pain management  - Manage/alleviate anxiety  - Utilize distraction and/or relaxation techniques  - Monitor for opioid side effects  - Notify MD/LIP if interventions unsuccessful or patient reports new pain  - Anticipate increased pain with activity and pre-medicate as appropriate  Outcome: Progressing     Problem: POSTPARTUM  Goal: Optimize infant feeding at the breast  Description: INTERVENTIONS:  - Initiate breast feeding within first hour after birth.   - Monitor effectiveness of current breast feeding efforts.  - Assess support systems available to mother/family.  - Identify cultural beliefs/practices regarding lactation, letdown techniques, maternal food preferences.  - Assess mother's knowledge and previous experience with breast feeding.  - Provide information as needed about early infant feeding cues (e.g., rooting, lip smacking, sucking fingers/hand) versus late cue of crying.  - Discuss/demonstrate breast feeding aids (e.g., infant sling, nursing footstool/pillows, and breast pumps).  - Encourage mother/other family members to express feelings/concerns, and actively listen.  - Educate  father/SO about benefits of breast feeding and how to manage common lactation challenges.  - Recommend avoidance of specific medications or substances incompatible with breast feeding.  - Assess and monitor for signs of nipple pain/trauma.  - Instruct and provide assistance with proper latch.  - Review techniques for milk expression (breast pumping) and storage of breast milk. Provide pumping equipment/supplies, instructions and assistance, as needed.  - Encourage rooming-in and breast feeding on demand.  - Encourage skin-to-skin contact.  - Provide LC support as needed.  - Assess for and manage engorgement.  - Provide breast feeding education handouts and information on community breast feeding support.   Outcome: Progressing  Goal: Establishment of adequate milk supply with medication/procedure interruptions  Description: INTERVENTIONS:  - Review techniques for milk expression (breast pumping).   - Provide pumping equipment/supplies, instructions, and assistance until it is safe to breastfeed infant.  Outcome: Progressing  Goal: Experiences normal breast weaning course  Description: INTERVENTIONS:  - Assess for and manage engorgement.  - Instruct on breast care.  - Provide comfort measures.  Outcome: Progressing  Goal: Appropriate maternal -  bonding  Description: INTERVENTIONS:  - Assess caregiver- interactions.  - Assess caregiver's emotional status and coping mechanisms.  - Encourage caregiver to participate in  daily care.  - Assess support systems available to mother/family.  - Provide /case management support as needed.  Outcome: Progressing

## 2024-02-22 LAB
BASOPHILS # BLD AUTO: 0.02 X10(3) UL (ref 0–0.2)
BASOPHILS NFR BLD AUTO: 0.2 %
EOSINOPHIL # BLD AUTO: 0.12 X10(3) UL (ref 0–0.7)
EOSINOPHIL NFR BLD AUTO: 1.2 %
ERYTHROCYTE [DISTWIDTH] IN BLOOD BY AUTOMATED COUNT: 13.8 %
GLUCOSE BLD-MCNC: 83 MG/DL (ref 70–99)
HCT VFR BLD AUTO: 32.6 %
HGB BLD-MCNC: 10.6 G/DL
IMM GRANULOCYTES # BLD AUTO: 0.04 X10(3) UL (ref 0–1)
IMM GRANULOCYTES NFR BLD: 0.4 %
LYMPHOCYTES # BLD AUTO: 1.9 X10(3) UL (ref 1–4)
LYMPHOCYTES NFR BLD AUTO: 18.5 %
MCH RBC QN AUTO: 31.2 PG (ref 26–34)
MCHC RBC AUTO-ENTMCNC: 32.5 G/DL (ref 31–37)
MCV RBC AUTO: 95.9 FL
MONOCYTES # BLD AUTO: 0.68 X10(3) UL (ref 0.1–1)
MONOCYTES NFR BLD AUTO: 6.6 %
NEUTROPHILS # BLD AUTO: 7.52 X10 (3) UL (ref 1.5–7.7)
NEUTROPHILS # BLD AUTO: 7.52 X10(3) UL (ref 1.5–7.7)
NEUTROPHILS NFR BLD AUTO: 73.1 %
PLATELET # BLD AUTO: 213 10(3)UL (ref 150–450)
RBC # BLD AUTO: 3.4 X10(6)UL
WBC # BLD AUTO: 10.3 X10(3) UL (ref 4–11)

## 2024-02-22 PROCEDURE — 85025 COMPLETE CBC W/AUTO DIFF WBC: CPT | Performed by: OBSTETRICS & GYNECOLOGY

## 2024-02-22 PROCEDURE — 82962 GLUCOSE BLOOD TEST: CPT

## 2024-02-22 RX ORDER — BUTALBITAL, ACETAMINOPHEN AND CAFFEINE 50; 325; 40 MG/1; MG/1; MG/1
1 TABLET ORAL 2 TIMES DAILY PRN
Status: DISCONTINUED | OUTPATIENT
Start: 2024-02-22 | End: 2024-02-23

## 2024-02-22 NOTE — PLAN OF CARE
Problem: BIRTH - VAGINAL/ SECTION  Goal: Fetal and maternal status remain reassuring during the birth process  Description: INTERVENTIONS:  - Monitor vital signs  - Monitor fetal heart rate  - Monitor uterine activity  - Monitor labor progression (vaginal delivery)  - DVT prophylaxis (C/S delivery)  - Surgical antibiotic prophylaxis (C/S delivery)  Outcome: Completed     Problem: PAIN - ADULT  Goal: Verbalizes/displays adequate comfort level or patient's stated pain goal  Description: INTERVENTIONS:  - Encourage pt to monitor pain and request assistance  - Assess pain using appropriate pain scale  - Administer analgesics based on type and severity of pain and evaluate response  - Implement non-pharmacological measures as appropriate and evaluate response  - Consider cultural and social influences on pain and pain management  - Manage/alleviate anxiety  - Utilize distraction and/or relaxation techniques  - Monitor for opioid side effects  - Notify MD/LIP if interventions unsuccessful or patient reports new pain  - Anticipate increased pain with activity and pre-medicate as appropriate  Outcome: Completed     Problem: POSTPARTUM  Goal: Optimize infant feeding at the breast  Description: INTERVENTIONS:  - Initiate breast feeding within first hour after birth.   - Monitor effectiveness of current breast feeding efforts.  - Assess support systems available to mother/family.  - Identify cultural beliefs/practices regarding lactation, letdown techniques, maternal food preferences.  - Assess mother's knowledge and previous experience with breast feeding.  - Provide information as needed about early infant feeding cues (e.g., rooting, lip smacking, sucking fingers/hand) versus late cue of crying.  - Discuss/demonstrate breast feeding aids (e.g., infant sling, nursing footstool/pillows, and breast pumps).  - Encourage mother/other family members to express feelings/concerns, and actively listen.  - Educate father/SO  about benefits of breast feeding and how to manage common lactation challenges.  - Recommend avoidance of specific medications or substances incompatible with breast feeding.  - Assess and monitor for signs of nipple pain/trauma.  - Instruct and provide assistance with proper latch.  - Review techniques for milk expression (breast pumping) and storage of breast milk. Provide pumping equipment/supplies, instructions and assistance, as needed.  - Encourage rooming-in and breast feeding on demand.  - Encourage skin-to-skin contact.  - Provide LC support as needed.  - Assess for and manage engorgement.  - Provide breast feeding education handouts and information on community breast feeding support.   Outcome: Completed  Goal: Establishment of adequate milk supply with medication/procedure interruptions  Description: INTERVENTIONS:  - Review techniques for milk expression (breast pumping).   - Provide pumping equipment/supplies, instructions, and assistance until it is safe to breastfeed infant.  Outcome: Completed  Goal: Appropriate maternal -  bonding  Description: INTERVENTIONS:  - Assess caregiver- interactions.  - Assess caregiver's emotional status and coping mechanisms.  - Encourage caregiver to participate in  daily care.  - Assess support systems available to mother/family.  - Provide /case management support as needed.  Outcome: Completed

## 2024-02-22 NOTE — PROGRESS NOTES
Labor Analgesia Follow Up Note    Patient underwent epidural anesthesia for labor analgesia,    Placenta Date/Time: 2/21/2024  2:43 PM     Delivery Date/Time:: 2/21/2024   2:40 PM     /63 (BP Location: Left arm)   Pulse 74   Temp 98.6 °F (37 °C) (Oral)   Resp 18   Wt 59.9 kg (132 lb)   Breastfeeding Yes   BMI 21.31 kg/m²     Assessment:  Patient seen and no apparent anesthesia related complications.    Thank you for asking us to participate in the care of your patient.

## 2024-02-22 NOTE — PROGRESS NOTES
OB Progress Note PPD#1    S: Feels well. Ambulating, eating. Pain controlled. Moderate VB. Breastfeeding.  Voiding without difficulty, + flatus, no BM. Headache had improved after delivery but returned this morning.    O: Blood pressure 120/83, pulse 77, temperature 98.9 °F (37.2 °C), temperature source Oral, resp. rate 18, weight 132 lb (59.9 kg), currently breastfeeding.  Gen: NAD, AAOx3  Breasts: soft, nontender, nonerythematous  Abdomen: soft, nontender, nondistended, fundus firm and nontender  Gyne: moderate lochia  Ext: trace edema      Lab Results  Lab Results   Component Value Date    WBC 10.3 2024    HGB 10.6 2024    HCT 32.6 2024    .0 2024     Recent Labs   Lab 24  1003 24  0745   RBC 3.62* 3.40*   HGB 11.5* 10.6*   HCT 34.4* 32.6*   MCV 95.0 95.9   MCH 31.8 31.2   MCHC 33.4 32.5   RDW 13.8 13.8   NEPRELIM 7.03 7.52   WBC 9.4 10.3   .0 213.0           A/P: PPD#1 s/p , doing well  1. Continue pain control with motrin PRN  2. Breastfeeding   -- given encouragement and support   -- lactation consult PRN  3. Hgb stable at 10.6  4. DVT ppx: ambulating  5. Pre-eclampsia without severe features -- BP controlled. H/o migraines and has had throughout pregnancy so not pre-e related. Will use Fioricet sparingly and continue tylenol/motrin. Labs WNL  6. GDMA1 -- fasting normal, f/u postpartum  7. Anxiety -- on zoloft    Anticipate discharge tomorrow    Lizette Moses D.O.  [facility]  Contact via [employer] or cell

## 2024-02-23 VITALS
HEART RATE: 81 BPM | RESPIRATION RATE: 16 BRPM | SYSTOLIC BLOOD PRESSURE: 116 MMHG | BODY MASS INDEX: 21 KG/M2 | TEMPERATURE: 98 F | WEIGHT: 132 LBS | DIASTOLIC BLOOD PRESSURE: 75 MMHG

## 2024-02-23 RX ORDER — PSEUDOEPHEDRINE HCL 30 MG
100 TABLET ORAL DAILY
Qty: 14 CAPSULE | Refills: 0 | Status: SHIPPED | OUTPATIENT
Start: 2024-02-23

## 2024-02-23 RX ORDER — BUTALBITAL, ACETAMINOPHEN AND CAFFEINE 50; 325; 40 MG/1; MG/1; MG/1
1 TABLET ORAL 2 TIMES DAILY PRN
Qty: 12 TABLET | Refills: 0 | Status: SHIPPED
Start: 2024-02-23

## 2024-02-23 RX ORDER — IBUPROFEN 600 MG/1
600 TABLET ORAL EVERY 6 HOURS PRN
Qty: 20 TABLET | Refills: 0 | Status: SHIPPED | OUTPATIENT
Start: 2024-02-23

## 2024-02-23 NOTE — PLAN OF CARE
Problem: PAIN - ADULT  Goal: Verbalizes/displays adequate comfort level or patient's stated pain goal  Description: INTERVENTIONS:  - Encourage pt to monitor pain and request assistance  - Assess pain using appropriate pain scale  - Administer analgesics based on type and severity of pain and evaluate response  - Implement non-pharmacological measures as appropriate and evaluate response  - Consider cultural and social influences on pain and pain management  - Manage/alleviate anxiety  - Utilize distraction and/or relaxation techniques  - Monitor for opioid side effects  - Notify MD/LIP if interventions unsuccessful or patient reports new pain  - Anticipate increased pain with activity and pre-medicate as appropriate  Outcome: Completed     Problem: ANXIETY  Goal: Will report anxiety at manageable levels  Description: INTERVENTIONS:  - Administer medication as ordered  - Teach and rehearse alternative coping skills  - Provide emotional support with 1:1 interaction with staff  Outcome: Completed     Problem: Patient/Family Goals  Goal: Patient/Family Short Term Goal  Description: Patient's Short Term Goal:       Interventions:   -       - See additional Care Plan goals for specific interventions  Outcome: Completed     Problem: POSTPARTUM  Goal: Optimize infant feeding at the breast  Description: INTERVENTIONS:  - Initiate breast feeding within first hour after birth.   - Monitor effectiveness of current breast feeding efforts.  - Assess support systems available to mother/family.  - Identify cultural beliefs/practices regarding lactation, letdown techniques, maternal food preferences.  - Assess mother's knowledge and previous experience with breast feeding.  - Provide information as needed about early infant feeding cues (e.g., rooting, lip smacking, sucking fingers/hand) versus late cue of crying.  - Discuss/demonstrate breast feeding aids (e.g., infant sling, nursing footstool/pillows, and breast pumps).  -  Encourage mother/other family members to express feelings/concerns, and actively listen.  - Educate father/SO about benefits of breast feeding and how to manage common lactation challenges.  - Recommend avoidance of specific medications or substances incompatible with breast feeding.  - Assess and monitor for signs of nipple pain/trauma.  - Instruct and provide assistance with proper latch.  - Review techniques for milk expression (breast pumping) and storage of breast milk. Provide pumping equipment/supplies, instructions and assistance, as needed.  - Encourage rooming-in and breast feeding on demand.  - Encourage skin-to-skin contact.  - Provide LC support as needed.  - Assess for and manage engorgement.  - Provide breast feeding education handouts and information on community breast feeding support.   Outcome: Completed  Goal: Establishment of adequate milk supply with medication/procedure interruptions  Description: INTERVENTIONS:  - Review techniques for milk expression (breast pumping).   - Provide pumping equipment/supplies, instructions, and assistance until it is safe to breastfeed infant.  Outcome: Completed  Goal: Appropriate maternal -  bonding  Description: INTERVENTIONS:  - Assess caregiver- interactions.  - Assess caregiver's emotional status and coping mechanisms.  - Encourage caregiver to participate in  daily care.  - Assess support systems available to mother/family.  - Provide /case management support as needed.  Outcome: Completed

## 2024-02-23 NOTE — PROGRESS NOTES
DISCHARGE NOTE  Discharge and follow-up appointment information reviewed with parents, no questions following.  ID Bands checked and verified at bedside. HUGS and Kisses tags removed  Baby in: car seat  Parent ambulatory  Escorted off unit by: pct

## 2024-02-23 NOTE — DISCHARGE INSTRUCTIONS
Post Vaginal Delivery Home Care Instructions       We hope you were pleased with your care at [facility].  We wish you the best outcome and overall experience with the delivery of your baby.  These instructions will help to minimize pain, limit the risk for an infection, and improve the likelihood of a successful recovery.    What to Expect:  Abdominal cramping after delivery especially if you are breastfeeding.   Vaginal bleeding for about 4-6 weeks that may be followed by a yellow or white discharge for a few more weeks.  Your period will resume in approximately 6-8 weeks, unless you are breastfeeding.    If you are bottle feeding, you may notice breast engorgement in about 3 days.  Your breast may be sore and hard. Please wear a tight fitted bra or sports bra for 24-36 hours to help prevent your breast from producing milk, and use ice packs to relive any discomfort.  If you are breastfeeding, nipple dryness is very common the first few days.    Constipation is common after having a baby.  Please increase fluid and fiber in your diet.      Over-The-Counter Medication  Non-prescription anti-inflammatory medications can also help to ease the pain.  You may take Aleve, Tylenol or Ibuprofen   Colace or Metamucil for Constipation  Lanolin for dry nipples  Tucks, Witch Hazel and Epifoam for Episiotomy discomfort.   Drink a full glass of water with oral medication and take as directed.    Wound Care  The following instructions will promote proper healing and help to prevent infection  Episiotomy Care: Sitz Bath for 15mins, 2-3 times a day,    Bathing/Showers  You may resume showers  No baths, swimming, hot tubs until your post-partum visit    Home Medication  Resume your home medications as instructed    Diet  Resume your normal diet    Activity  Refrain from vaginal intercourse, vaginal suppositories, tampon use or douches until after your post-partum visit.  No exercising for 4 weeks  You may climb stairs  minimally for the 1st week.    Do not do heavy housework for at least 2-3 weeks    Return to Work or School  You may return to work in approximately 6 weeks  Contact your obstetrician’s office, if you need a medical release.     Driving  Avoid driving if you are taking narcotics for pain relief.    Follow-up Appointment with Your Obstetrician  Call your obstetrician’s office today for an appointment in               weeks.    Verify your appointment date, day, time, and location.  At your 1st post-partum office visit:  Your progress will be evaluated, findings reviewed, and any additional concerns and instructions will be discussed.    Questions or Concerns  Call your obstetrician’s office if you experience the following:  Severe pain not controlled by pain medication  Too much pain when touched; yellowish, greenish, or bloody discharge, foul smelling vaginal discharge, cut site opens up  Heavy bleeding,problems with pain that does not go away or gets worse  Shortness of breath or sudden onset of chest pain  Fever of 100.4 F (38 C) or higher, chills, warmth around wound that will not heal  Redness, increased swelling or drainage from your incision  Crying and periods of sadness that prevents you from caring for yourself and your baby or you feel like harming yourself or baby.  Burning sensation during urination or inability to urinate or have bowel movements  Swelling, redness or abnormal warmth to your leg/calf  Swollen, hard, or painful breasts  You are not feeling better in 2 to 3 days, or are feeling increasingly worse  If your call is made after office hours, a physician will be available to help you.  There is always a provider covering our patients.            MEDICATIONS offered/used during your stay:    @ MOTRIN (Ibuprofin 600mg)   1 tab every 6 hours as needed for pain   Last taken at:   --> Next dose due at:       @ TYLENOL (Acetaminophen 500)   1-2 tabs, every 6 hours, as needed for increased pain.  Last  taken at:   --> Next dose due at:      @ COLACE (Docusate Sodium 100mg)  1 tab two times per day as needed for stool softening   (once in am/once in pm)  Last taken at:   --> Next dose due at:      @ SIMETHICONE (Mylicon 80mg) Chewable Tab   1 tab daily as needed for gas.  Last taken at:    @ DERMOPLAST (Pain Relieving Spray)   Use as needed for perineal discomfort    @ TUCKS (Witch-Alla Glycerin pads)   Use as needed for hemorrhoids and/or perineal discomfort    Please see your Parent-Infant instruction pamphlet in your white [place] folder for further reference/review/instructions.  Thank you for coming to [facility].  We hope you've enjoyed your experience here.

## 2024-02-23 NOTE — PROGRESS NOTES
OB Progress Note PPD#2    S: Feels well. Ambulating, eating. Pain controlled. Moderate VB. Breastfeeding.  Voiding without difficulty, + flatus, no BM. Headache had improved     O: Blood pressure 116/75, pulse 81, temperature 98.3 °F (36.8 °C), temperature source Oral, resp. rate 16, weight 132 lb (59.9 kg), currently breastfeeding.  Gen: NAD, AAOx3  Breasts: soft, nontender, nonerythematous  Abdomen: soft, nontender, nondistended, fundus firm and nontender  Gyne: moderate lochia  Ext: trace edema      Lab Results       Recent Labs   Lab 24  1003 24  0745   RBC 3.62* 3.40*   HGB 11.5* 10.6*   HCT 34.4* 32.6*   MCV 95.0 95.9   MCH 31.8 31.2   MCHC 33.4 32.5   RDW 13.8 13.8   NEPRELIM 7.03 7.52   WBC 9.4 10.3   .0 213.0           A/P: PPD#2 s/p , doing well  1. Continue pain control with motrin PRN  2. Breastfeeding   -- given encouragement and support   -- lactation consult PRN  3. Hgb stable at 10.6  4. DVT ppx: ambulating  5. Pre-eclampsia without severe features -- BP controlled. H/o migraines and has had throughout pregnancy so not pre-e related. Will use Fioricet sparingly and continue tylenol/motrin. Labs WNL  6. GDMA1 -- fasting normal, f/u postpartum  7. Anxiety -- on zoloft    Dischargehome  Precautions reviewed  Discussed BP check in 72 hour with clinic RN    Dodie Gilliam MD

## 2024-02-26 ENCOUNTER — TELEPHONE (OUTPATIENT)
Dept: OBGYN UNIT | Facility: HOSPITAL | Age: 34
End: 2024-02-26

## 2024-02-26 NOTE — PROGRESS NOTES
Unable to reach patient at this time.  Left message to check clypd for additional information and to reach out to their MDs with any questions or concerns.

## 2024-02-28 NOTE — L&D DELIVERY NOTE
Flora Centeno [[id number]]      Labor Events     labor?: Yes   steroids?: Full Course  Antibiotics received during labor?: No  Rupture date/time: 2024 1015     Rupture type: AROM  Fluid color: Clear  Labor type: Induced Onset of Labor  Induction: Oxytocin, AROM  Indications for induction: Poor Fetal Growth, IDDM/GDDM, Preeclampsia  Intrapartum & labor complications: None       Labor Length    1st stage: 4h 15m  2nd stage: 0h 10m  3rd stage: 0h 02m       Labor Event Times    Labor onset date/time: 2024 1015  Dilation complete date/time: 2024  Start pushing date/time: 2024 143       [place] Presentation    Presentation: Vertex  Position: Right Occiput Anterior       Operative Delivery    Operative Vaginal Delivery: No                      Shoulder Dystocia    Shoulder Dystocia: No             Anesthesia    Method: Epidural               Delivery      Head delivery date/time: 2024 14:39:55   Delivery date/time:  24 14:40:15   Delivery type: Normal spontaneous vaginal delivery    Details:     Delivery location: delivery room       Delivery Providers    Delivering Clinician: Maritza Cohen MD   Delivery personnel:  Provider Role   Khadijah Lopez, RN Baby Nurse   Lulu Ruiz RN Delivery Nurse             Cord    Vessels: 3 Vessels  Complications: None  Timed cord clamping: Yes  Time in sec: 60  Cord blood disposition: to lab  Gases sent?: No       Resuscitation    Method: None        Measurements      Weight: 2510 g 5 lb 8.5 oz Length: 45.7 cm     Head circum.: 31.5 cm Chest circum.: 30.5 cm          Abdominal circum.: 28 cm           Placenta    Date/time: 2024 1443  Removal: Spontaneous  Appearance: Intact  Disposition: Pathology       Apgars    Living status: Living   Apgar Scoring Key:    0 1 2    Skin color Blue or pale Acrocyanotic Completely pink    Heart rate Absent <100 bpm >100 bpm    Reflex irritability No response Grimace  Cry or active withdrawal    Muscle tone Limp Some flexion Active motion    Respiratory effort Absent Weak cry; hypoventilation Good, crying              1 Minute:  5 Minute:  10 Minute:  15 Minute:  20 Minute:      Skin color: 1  1       Heart rate: 2  2       Reflex irritablity: 2  2       Muscle tone: 2  2       Respiratory effort: 2  2       Total: 9  9          Apgars assigned by: ZACHERY CHERRY  [place] disposition: with mother       Skin to Skin    Skin to skin initiated date/time: 2024 1455  Skin to skin with: Mother       Vaginal Count    Initial count RN: Lulu Ruiz RN  Initial count Tech: Jodie Prakash    Initial counts 11   0    Final counts 11   0    Final count RN: Lulu Ruiz RN  Final count MD: Maritza Cohen MD       Delivery (Maternal)    Episiotomy: None  Perineal lacerations: None Repaired?: No     Vaginal laceration?: No      Cervical laceration?: No    Clitoral laceration?: No    Quantitative blood loss (mL): 25              She was found to be complete/+2. She pushed with good effort for 5 minutes under epidural anesthesia. She then delivered a viable baby girl via . The baby was vigorous on delivery and was bulb suctioned. She was placed on the mother's lap for warming. Cord clamping was delayed 60 seconds. The cord was clamped and cut, and cord blood was collected. The placenta delivered spontaneously, was examined, and was intact. Her uterus was massaged and was firm. She had no laceration. EBL 25 mL.

## (undated) DEVICE — FILTERLINE NASAL ADULT O2/CO2

## (undated) DEVICE — FORCEP BIOPSY RJ4 LG CAP W/ND

## (undated) DEVICE — 3M™ RED DOT™ MONITORING ELECTRODE WITH FOAM TAPE AND STICKY GEL, 50/BAG, 20/CASE, 72/PLT 2570: Brand: RED DOT™

## (undated) DEVICE — ENDOSCOPY PACK UPPER: Brand: MEDLINE INDUSTRIES, INC.

## (undated) DEVICE — Device: Brand: DEFENDO AIR/WATER/SUCTION AND BIOPSY VALVE

## (undated) DEVICE — 1200CC GUARDIAN II: Brand: GUARDIAN

## (undated) NOTE — LETTER
[facility]  Cipriano Nieto [address] [facility], [address] [place] [address]    Consent for Operation    Date: __________________    Time: _______________    1.  I authorize the performance upon AzaliaSardinia Hartley the following operation:                              Primary Ce procedure has been videotaped, the surgeon will obtain the original videotape. The hospital will not be responsible for storage or maintenance of this tape.     6. For the purpose of advancing medical education, I consent to the admittance of observers to t STATEMENTS REQUIRING INSERTION OR COMPLETION WERE FILLED IN.     Signature of Patient:   ___________________________    When the patient is a minor or mentally incompetent to give consent:  Signature of person authorized to consent for patient: ____________ · If I am allergic to anything or have had a reaction to anesthesia before. 3. I understand how the anesthesia medicine will help me (benefits). 4. I understand that with any type of anesthesia medicine there are risks:  a.  The most common risks are: their representative has agreed to have anesthesia services.     _____________________________________________________________________________  Witness        Date   Time  I have verified that the signature is that of the patient or patient’s representative

## (undated) NOTE — LETTER
Dear new mom:    We've missed you! The nurses of [facility] have tried to reach you by phone to ask if you had any questions regarding your health or the care of your new little one.     Please feel free to call your doctor with an

## (undated) NOTE — LETTER
February 15, 2024      Mitch Trinidad MD  [address]  [facility] [address]  Via Outside Provider Messaging  Patient: Martina Centeno  : 1990    Dear Colleague:  Thank you for referring your patient to me for a Maternal Fetal Medicine evaluation. Please see my attached note for my findings and recommendations.      Should you have any questions or concerns, please do not hesitate to contact me at the number listed below.    Best Regards,      Ld Block MD  [facility]  [address] [facility] [address]  [facility] [address]  [phone]    cc: No Recipients      [facility] Department of Maternal Fetal Medicine  Patient Name: Martina Centeno  Patient : 1990  Physician: Ld Block MD    Outpatient Maternal-Fetal Medicine Follow-Up     Dear Dr. Trinidad     Thank you for requesting ultrasound evaluation and maternal fetal medicine consultation on your patient Martina Centeno.  As you are aware she is a 33 year old female  with a mcneal pregnancy and an Estimated Date of Delivery: 3/19/24.  She returned to maternal-fetal medicine today for a follow-up visit.  Her history was reviewed from her prior visit and there were no interval changes.     Antepartum Risk Factors  Fetal growth restriction  Mild preeclampsia without severe features  Maternal migraines, suboptimally treated due to medication limitations in pregnancy     PHYSICAL EXAMINATION:  /83 (BP Location: Right arm, Patient Position: Sitting, Cuff Size: adult)   Pulse 93   Ht 5' 6\" (1.676 m)   Wt 132 lb (59.9 kg)   BMI 21.31 kg/m²   General: alert and oriented, no acute distress  Abdomen: gravid, soft, non-tender  Extremities: non-tender, no edema     OBSTETRIC ULTRASOUND     Ultrasound Findings:  Single IUP in cephalic presentation.    Placenta is anterior.   A 3 vessel cord is noted.  Cardiac activity is present at 121 bpm  EFW 2176 g ( 4 lb 13 oz); 15%.  AC 8%  JULIET is  13.7 cm.  MVP is 5  cm  BPP is 8/8.   Normal UA Dopplers - S/D  - 2.75 (67%)  The fetal measurements are consistent with established EDC. No gross ultrasound evidence of structural abnormalities are seen today. The patient understands that ultrasound cannot rule out all structural and chromosomal abnormalities.     See PACS/Imaging Tab For Complete Ultrasound Report  I interpreted the results and reviewed them with the patient.     DISCUSSION  During her visit we discussed and reviewed the following issues:  PREECLAMPSIA     Patient Review   Admitted 24-24.      Preeclampsia Labs     24-hour 342 mg  Serum labs - 24  AST: 16  ALT 16  Creatinine: 0.38  Plt: 309     BP Review  Anti-Hypertensive medications:  No Medications  Home BP's: 110-120's/80-95's  Her blood pressure control is adequate.  Medical Regimen Recommendation: no change.     Discussion  See prior MFM notes for a detailed review.     FETAL GROWTH RESTRICTION  FGR was diagnosed on prior ultrasound.  The patient returned for a follow-up assessment today.    DISCUSSION  See prior MFM notes for a detailed review.    Category  FGR (AC < 10%) with NORMAL UA Dopplers    Subsequent Management  See below     IMPRESSION:  IUP at 35w2d  Fetal growth restriction - BPP 8/8 , normal umbilical artery Doppler  Mild preeclampsia without severe features  Maternal migraines, suboptimally treated due to medication limitations in pregnancy - persistent  GDM A1 - on ADA diet, managed by OB     RECOMMENDATIONS:  Continue care with Dr. Trinidad  Twice weekly  testing -weekly NST with OB office and weekly BPP with MFM office  Delivery is advised at 36- 37 weeks due to preeclampsia with FGR and persistent maternal headaches - unless an indication for earlier delivery arises     Thank you for allowing me to participate in the care of your patient.  Please do not hesitate to contact me if additional questions or concerns arise.       Ld Block M.D.     30 minutes spent in  review of records, patient consultation, documentation and coordination of care.  The relevant clinical matter(s) are summarized above.      Note to patient and family  The 21st Century Cures Act makes medical notes available to patients in the interest of transparency.  However, please be advised that this is a medical document.  It is intended as mmgi-fq-gadu communication.  It is written and medical language may contain abbreviations or verbiage that are technical and unfamiliar.  It may appear blunt or direct.  Medical documents are intended to carry relevant information, facts as evident, and the clinical opinion of the practitioner.    Pt here for Growth Ultrasound  +fm noted per patient  Pt denies complaints.

## (undated) NOTE — ED AVS SNAPSHOT
Светлана Veloz   MRN: [id number]    Department:  [facility] Emergency Department   Date of Visit:  11/18/2019           Disclosure     Insurance plans vary and the physician(s) referred by the ER may not be covered by your plan.  Please contact you tell this physician (or your personal doctor if your instructions are to return to your personal doctor) about any new or lasting problems. The primary care or specialist physician will see patients referred from the [facility] Emergency Department.  Carolina Resendez

## (undated) NOTE — LETTER
1/16/2019          Sonali Petit  [address] [facility] [address]    Dear Carolynn Funes,       Here are the biopsy/pathology findings from your recent EGD (Upper  Endoscopy):    Normal stomach biopsies, no infection seen.     1.  Continue Pantoprazo

## (undated) NOTE — LETTER
2024      Mitch Trinidad MD  [address] [facility] [address] [address] [address]  [facility] [address]  Via Outside Provider Messaging  Patient: Martina Centeno  : 1990    Dear Colleague:  Thank you for referring your patient to me for a Maternal Fetal Medicine evaluation. Please see my attached note for my findings and recommendations.      Should you have any questions or concerns, please do not hesitate to contact me at the number listed below.    Best Regards,      Ld Block MD  [facility]  [address] [facility] [address]  [facility] [address]  [phone]    cc: No Recipients      [facility] Department of Maternal Fetal Medicine  Patient Name: Martina Centeno  Patient : 1990  Physician: Ld Block MD    Outpatient Maternal-Fetal Medicine Follow-Up    Dear Dr. Trinidad    Thank you for requesting ultrasound evaluation and maternal fetal medicine consultation on your patient Martina Centeno.  As you are aware she is a 33 year old female  with a mcneal pregnancy and an Estimated Date of Delivery: 3/19/24.  She returned to maternal-fetal medicine today for a follow-up visit.  Her history was reviewed from her prior visit and there were no interval changes.    Antepartum Risk Factors  Fetal growth restriction  Mild preeclampsia without severe features  Maternal migraines, suboptimally treated due to medication limitations in pregnancy    PHYSICAL EXAMINATION:  /79 (BP Location: Right arm, Patient Position: Sitting, Cuff Size: adult)   Pulse 92   Wt 131 lb (59.4 kg)   BMI 21.14 kg/m²   General: alert and oriented, no acute distress  Abdomen: gravid, soft, non-tender  Extremities: non-tender, no edema    OBSTETRIC ULTRASOUND    Ultrasound Findings:  Single IUP in cephalic presentation.    Placenta is anterior.   Cardiac activity is present at 120 bpm  MVP is 3.6 cm . JULIET 12.2 cm  BPP is 8/8.   Umbilical Artery Doppler is 3.35.     BPP 8/8    See PACS/Imaging Tab For  Complete Ultrasound Report  I interpreted the results and reviewed them with the patient.    DISCUSSION  During her visit we discussed and reviewed the following issues:  PREECLAMPSIA    Patient Review   Admitted 24-24.     Preeclampsia Labs    24-hour 342 mg  Serum labs - 24  AST: 16  ALT 16  Creatinine: 0.38  Plt: 309    BP Review  Anti-Hypertensive medications:  No Medications  Home BP's: 110-120's/80-95's  Her blood pressure control is adequate.  Medical Regimen Recommendation: no change.    Discussion  See prior MFM notes for a detailed review.    FETAL GROWTH RESTRICTION  FGR was diagnosed on prior ultrasound.  The patient returned for a follow-up assessment today.    DISCUSSION  See prior MFM notes for a detailed review.    Category  FGR (AC < 10%) with NORMAL UA Dopplers    Subsequent Management  See below    IMPRESSION:  IUP at 34w3d  Fetal growth restriction - BPP  , normal umbilical artery Doppler  Mild preeclampsia without severe features  Maternal migraines, suboptimally treated due to medication limitations in pregnancy     RECOMMENDATIONS:  Continue care with Dr. Moses  Follow-up growth BPP and Doppler every 3 weeks due to fetal growth restriction (next growth ultrasound due in 1 week)  Twice weekly  testing -weekly NST with OB office and weekly BPP with MFM office  Delivery is advised at 37 weeks unless an indication for earlier delivery arises -may need to consider earlier delivery if intractable headache ensues    Thank you for allowing me to participate in the care of your patient.  Please do not hesitate to contact me if additional questions or concerns arise.      Ld Block M.D.    30 minutes spent in review of records, patient consultation, documentation and coordination of care.  The relevant clinical matter(s) are summarized above.     Note to patient and family  The 21st Century Cures Act makes medical notes available to patients in the interest of  transparency.  However, please be advised that this is a medical document.  It is intended as pdei-sn-mdcw communication.  It is written and medical language may contain abbreviations or verbiage that are technical and unfamiliar.  It may appear blunt or direct.  Medical documents are intended to carry relevant information, facts as evident, and the clinical opinion of the practitioner.    Pt here for BPP  + FM  Pt c/o occas cramping, denies vag bleeding and leaking fluid.

## (undated) NOTE — LETTER
Julissa Mckinney [address] [address]  [place], [address] [facility]    Consent for Operation  Date: __________________                                Time: _______________    1.  I authorize the performance upon Laura Forbes the following operation:   Esophagogas videotape. [facility] will not be responsible for storage or maintenance of this tape. 6. For the purpose of advancing medical education, I consent to the admittance of observers to the Operating Room.   7. I authorize the use of any specimen, organs, ti When the patient is a minor or mentally incompetent to give consent:  Signature of person authorized to consent for patient: ___________________________   Relationship to patient: ____________________________________________________    Signature of Witness these medicines may increase my risk of anesthetic complications. iv. If I am allergic to anything or have had a reaction to anesthesia before. 3. I understand how the anesthesia medicine will help me (benefits).   4. I understand that with any type of an patient’s representative) and answered their questions. The patient or their representative has agreed to have anesthesia services.     _____________________________________________________________________________  Witness        Date   Time  I have igor

## (undated) NOTE — LETTER
Dear New MomBasil, we missed you! The nurses of [facility] have tried to reach you by phone to ask if you have any questions regarding your health or the health and care of your new little one.    We hope you are doing well. If, for any reason, you have questions or concerns about your health or your baby’s health, please contact your provider or your pediatrician or family medicine physician regarding your baby.     At [facility], we feel that postpartum support is very important for new families. Please see the enclosed new parent support flyer that lists support programs and resources with both in-person and online options.     Additionally, our Breastfeeding Centers at [facility] and [facility] in [place], offer outpatient visits with our International Board-Certified Lactation   Consultants (IBCLCs) for any breastfeeding concerns or questions you may have.    For issues related to stress, anxiety or depression, we have a Nurturing Mom support group that meets both in-person or online.  There’s also a 24-hour Mom’s Line where you can request a phone call from a clinical therapist for assistance for postpartum depression.    We encourage you to take advantage of these programs and resources as you recover from childbirth and learn to care for your new infant.    Best wishes,    [facility]            [id number]

## (undated) NOTE — LETTER
2024      Lizette Moess, DO  [address]  [place] [address]  Via Outside Provider Messaging  Patient: Martina Centeno  : 1990    Dear Colleague:  Thank you for referring your patient to me for a Maternal Fetal Medicine evaluation. Please see my attached note for my findings and recommendations.      Should you have any questions or concerns, please do not hesitate to contact me at the number listed below.    Best Regards,      Judy Schmid MD  [facility]  [address] [facility] [address]  [place] [address]  [phone]    cc: No Recipients      [facility] Department of Maternal Fetal Medicine  Patient Name: Martina Centeno  Patient : 1990  Physician: Judy Schmid MD    Pt here for UA Dopplers/BPP  +fm noted per patient  Pt c/o H/A x 9 days (pt was in L&D last week ).  Pt GDM--diet controlled--OB managing    Outpatient Maternal-Fetal Medicine Consultation    Dear Dr. Moses,    Thank you for requesting ultrasound evaluation and maternal fetal medicine consultation on your patient Martina Centeno.  As you are aware she is a 33 year old female with a Masters pregnancy at 33w3d.  A maternal-fetal medicine consultation was requested secondary to fetal growth restriction, preeclampsia without severe features.  History of migraines which have increased due to inability to maintain her normal migraine medication.  Her prenatal records and labs were reviewed.    HISTORY  OB History    Para Term  AB Living   3 2 1 1 0 2   SAB IAB Ectopic Multiple Live Births   0 0 0 0 2     # 1 - Date: 18, Sex: Male, Weight: 6 lb 5.2 oz (2.87 kg), GA: 35w4d, Delivery: Normal spontaneous vaginal delivery, Apgar1: 9, Apgar5: 9, Living: Living, Birth Comments: Mitch Trinidad MD    # 2 - Date: 19, Sex: Male, Weight: 6 lb 0.3 oz (2.73 kg), GA: 38w6d, Delivery: Normal spontaneous vaginal delivery, Apgar1: 8, Apgar5: 9, Living: Living, Birth  Comments: None    # 3 - Date: None, Sex: None, Weight: None, GA: None, Delivery: None, Apgar1: None, Apgar5: None, Living: None, Birth Comments: None    Past Medical History  The patient  has a past medical history of Abdominal pain, Acute cholecystitis (1/10/2019), Anemia, Anxiety (), Cervical dysplasia (), Mental disorder, Migraines, Nausea & vomiting, Pancreas divisum, and  labor.    She has no past medical history of Difficult intubation or Malignant hyperthermia.    Past Surgical History  The patient  has a past surgical history that includes upper gi endoscopy,biopsy (19= Bx: Normal); cholecystectomy (2019); and other surgical history (2019).    Family History  The patient She indicated that her mother is alive. She indicated that her father is alive. She indicated that her sister is alive. She indicated that her maternal grandmother is . She indicated that both of her sons are alive. She indicated that the status of her neg is unknown.      Medications:   Current Outpatient Medications:     MAGNESIUM OR, Take 400 mg by mouth daily., Disp: , Rfl:     cholecalciferol 50 MCG (2000 UT) Oral Cap, Take 1 capsule (2,000 Units total) by mouth daily., Disp: , Rfl:     metoclopramide 5 MG Oral Tab, Take 1 tablet (5 mg total) by mouth 3 (three) times daily as needed (headache)., Disp: 30 tablet, Rfl: 0    prenatal vitamin with DHA 27-0.8-228 MG Oral Cap, Take 1 capsule by mouth daily., Disp: , Rfl:     progesterone 200 MG Oral Cap, Place 1 capsule (200 mg total) vaginally nightly., Disp: , Rfl:     sertraline 50 MG Oral Tab, Take 1 tablet (50 mg total) by mouth daily., Disp: , Rfl:     Codeine Sulfate 15 MG Oral Tab, Take 1 tablet (15 mg total) by mouth every 6 (six) hours as needed for Pain., Disp: 15 tablet, Rfl: 0  Allergies: No Known Allergies      PHYSICAL EXAMINATION:  /78 (BP Location: Right arm, Patient Position: Sitting, Cuff Size: adult)   Pulse 107   Ht 5' 6\"  (1.676 m)   Wt 132 lb (59.9 kg)   BMI 21.31 kg/m²   General: alert and oriented,no acute distress  Abdomen: gravid, soft, non-tender  Extremities: non-tender, no edema      OBSTETRIC ULTRASOUND  The patient had a follow-up, BPP and umbilical artery Doppler ultrasound today which I interpreted the results and reviewed them with the patient.    Ultrasound Findings:  Single IUP in cephalic presentation.    Placenta is anterior.   Cardiac activity is present at 137 bpm  MVP is 6.2 cm . JULIET 16.4 cm  BPP is 8/8.   Umbilical Artery Doppler is 3.35.     Normal UA Doppler.     See imaging tab for the complete US report.    DISCUSSION  During her visit we discussed and reviewed the following issues:  Preeclampsia -discussed previously and reviewed.  See inpatient consultation from 2024 for detailed review    Migraines -   Her headache is felt to be related to her untreated migraines.  Fioricet is helping but does not resolve the headache completely.  There is been no change.    FETAL GROWTH RESTRICTION    Diagnosed 2024 and discussed.  See note from that day for detailed review.  The overall growth was within range but the abdominal circumference was less than the 10th percentile.  There is normal umbilical artery Doppler  Category  FGR (AC < 10%) with NORMAL UA Dopplers    Subsequent Management  Weekly NST's  Repeat UA Dopplers & AFV in 1-2 weeks; if normal repeat Dopplers with each (q 3 week) growth ultrasound  Repeat growth ultrasound with UA Dopplers q 3 weeks  Delivery advised at 38-39 weeks      IMPRESSION:  IUP at 33w3d; cephalic  Fetal growth restriction -normal umbilical artery Doppler  Reassuring  testing  Mild preeclampsia without severe features  Maternal migraines, suboptimally treated due to medication limitations in pregnancy    RECOMMENDATIONS:  Continue care with Dr. Moses  Follow-up growth BPP and Doppler every 3 weeks due to fetal growth restriction (next growth ultrasound due in 2  weeks)  Twice weekly  testing -weekly NST with OB office and weekly BPP with MFM office  Delivery is advised at 37 weeks unless an indication for earlier delivery arises    Total time spent 40 minutes this calendar day which includes preparing to see the patient including chart review, obtaining and/or reviewing additional medical history, performing a physical exam and evaluation, documenting clinical information in the electronic medical record, independently interpreting results, counseling the patient, communicating results to the patient/family/caregiver and coordinating care.     Case discussed with patient who demonstrated understanding and agreement with plan.     Thank you for allowing me to participate in the care of this patient.  Please feel free to contact me with any questions.    Judy Schmid MD  Maternal-Fetal Medicine       Note to patient and family:  The 21st Century Cures Act makes medical notes available to patients in the interest of transparency.  However, please be advised that this is a medical document.  It is intended as a peer to peer communication.  It is written in medical language and may contain abbreviations or verbiage that are technical and unfamiliar.  It may appear blunt or direct.  Medical documents are intended to carry relevant information, facts as evident, and the clinical opinion of the practitioner.